# Patient Record
Sex: FEMALE | Race: WHITE | NOT HISPANIC OR LATINO
[De-identification: names, ages, dates, MRNs, and addresses within clinical notes are randomized per-mention and may not be internally consistent; named-entity substitution may affect disease eponyms.]

---

## 2017-05-23 ENCOUNTER — TRANSCRIPTION ENCOUNTER (OUTPATIENT)
Age: 27
End: 2017-05-23

## 2017-07-12 ENCOUNTER — TRANSCRIPTION ENCOUNTER (OUTPATIENT)
Age: 27
End: 2017-07-12

## 2018-09-27 ENCOUNTER — TRANSCRIPTION ENCOUNTER (OUTPATIENT)
Age: 28
End: 2018-09-27

## 2019-06-29 ENCOUNTER — TRANSCRIPTION ENCOUNTER (OUTPATIENT)
Age: 29
End: 2019-06-29

## 2019-07-09 ENCOUNTER — OTHER (OUTPATIENT)
Age: 29
End: 2019-07-09

## 2019-07-09 ENCOUNTER — APPOINTMENT (OUTPATIENT)
Dept: OBGYN | Facility: CLINIC | Age: 29
End: 2019-07-09

## 2019-07-11 LAB
APPEARANCE: CLEAR
BACTERIA UR CULT: NORMAL
BACTERIA: NEGATIVE
BILIRUBIN URINE: NEGATIVE
BLOOD URINE: ABNORMAL
COLOR: NORMAL
GLUCOSE QUALITATIVE U: NEGATIVE
HYALINE CASTS: 2 /LPF
KETONES URINE: NEGATIVE
LEUKOCYTE ESTERASE URINE: ABNORMAL
MICROSCOPIC-UA: NORMAL
NITRITE URINE: NEGATIVE
PH URINE: 6.5
PROTEIN URINE: NEGATIVE
RED BLOOD CELLS URINE: 2 /HPF
SPECIFIC GRAVITY URINE: 1.01
SQUAMOUS EPITHELIAL CELLS: 3 /HPF
UROBILINOGEN URINE: NORMAL
WHITE BLOOD CELLS URINE: 8 /HPF

## 2019-08-22 ENCOUNTER — APPOINTMENT (OUTPATIENT)
Dept: UROGYNECOLOGY | Facility: CLINIC | Age: 29
End: 2019-08-22

## 2019-12-02 ENCOUNTER — APPOINTMENT (OUTPATIENT)
Dept: OBGYN | Facility: CLINIC | Age: 29
End: 2019-12-02
Payer: COMMERCIAL

## 2019-12-02 VITALS
BODY MASS INDEX: 24.52 KG/M2 | HEIGHT: 65 IN | WEIGHT: 147.19 LBS | DIASTOLIC BLOOD PRESSURE: 88 MMHG | SYSTOLIC BLOOD PRESSURE: 122 MMHG

## 2019-12-02 DIAGNOSIS — Z78.9 OTHER SPECIFIED HEALTH STATUS: ICD-10-CM

## 2019-12-02 DIAGNOSIS — Z83.3 FAMILY HISTORY OF DIABETES MELLITUS: ICD-10-CM

## 2019-12-02 PROCEDURE — 99203 OFFICE O/P NEW LOW 30 MIN: CPT

## 2019-12-02 NOTE — HISTORY OF PRESENT ILLNESS
[Normal Amount/Duration] : was of a normal amount and duration [Regular Cycle Intervals] : periods have been regular [Frequency: Q ___ days] : menstrual periods occur approximately every [unfilled] days [Menarche Age: ____] : age at menarche was [unfilled] [Sexually Active] : is sexually active [Bleeding] : bleeding [Pregnancy] : pregnancy [Spotting Between  Menses] : no spotting between menses [Contraception] : does not use contraception

## 2019-12-02 NOTE — PHYSICAL EXAM
[Awake] : awake [Alert] : alert [Acute Distress] : no acute distress [Mass] : no breast mass [Nipple Discharge] : no nipple discharge [Axillary LAD] : no axillary lymphadenopathy [Soft] : soft [Oriented x3] : oriented to person, place, and time [Tender] : non tender [Normal] : uterus [Moderate] : there was moderate vaginal bleeding [Dilated] : the cervix was not dilated [Uterine Adnexae] : were not tender and not enlarged

## 2019-12-03 DIAGNOSIS — Z67.41 TYPE O BLOOD, RH NEGATIVE: ICD-10-CM

## 2019-12-03 LAB
ABO + RH PNL BLD: NORMAL
BLD GP AB SCN SERPL QL: NORMAL
HCG SERPL-MCNC: 339 MIU/ML

## 2019-12-03 RX ORDER — HUMAN RHO(D) IMMUNE GLOBULIN 300 UG/1
1500 INJECTION, SOLUTION INTRAMUSCULAR
Refills: 0 | Status: COMPLETED | OUTPATIENT
Start: 2019-12-03

## 2019-12-09 LAB — HCG SERPL-MCNC: 342 MIU/ML

## 2019-12-11 ENCOUNTER — APPOINTMENT (OUTPATIENT)
Dept: ANTEPARTUM | Facility: CLINIC | Age: 29
End: 2019-12-11
Payer: COMMERCIAL

## 2019-12-11 ENCOUNTER — ASOB RESULT (OUTPATIENT)
Age: 29
End: 2019-12-11

## 2019-12-11 LAB — HCG SERPL-MCNC: 273 MIU/ML

## 2019-12-11 PROCEDURE — 76856 US EXAM PELVIC COMPLETE: CPT | Mod: 59

## 2019-12-11 PROCEDURE — 76830 TRANSVAGINAL US NON-OB: CPT

## 2019-12-12 ENCOUNTER — APPOINTMENT (OUTPATIENT)
Dept: OBGYN | Facility: CLINIC | Age: 29
End: 2019-12-12

## 2019-12-20 LAB — HCG SERPL-MCNC: 126 MIU/ML

## 2020-04-25 ENCOUNTER — MESSAGE (OUTPATIENT)
Age: 30
End: 2020-04-25

## 2020-05-09 ENCOUNTER — APPOINTMENT (OUTPATIENT)
Dept: DISASTER EMERGENCY | Facility: CLINIC | Age: 30
End: 2020-05-09

## 2020-05-10 LAB
SARS-COV-2 IGG SERPL IA-ACNC: <0.1 INDEX
SARS-COV-2 IGG SERPL QL IA: NEGATIVE

## 2020-08-19 ENCOUNTER — APPOINTMENT (OUTPATIENT)
Dept: OBGYN | Facility: CLINIC | Age: 30
End: 2020-08-19
Payer: COMMERCIAL

## 2020-08-19 VITALS
WEIGHT: 143 LBS | BODY MASS INDEX: 23.82 KG/M2 | DIASTOLIC BLOOD PRESSURE: 70 MMHG | SYSTOLIC BLOOD PRESSURE: 118 MMHG | HEIGHT: 65 IN

## 2020-08-19 PROCEDURE — 99395 PREV VISIT EST AGE 18-39: CPT

## 2020-08-19 RX ORDER — FLUCONAZOLE 150 MG/1
150 TABLET ORAL
Qty: 1 | Refills: 1 | Status: DISCONTINUED | COMMUNITY
Start: 2020-08-13 | End: 2020-08-19

## 2020-08-19 NOTE — HISTORY OF PRESENT ILLNESS
[Normal Amount/Duration] : was of a normal amount and duration [Regular Cycle Intervals] : periods have been regular [Menarche Age: ____] : age at menarche was [unfilled] [Frequency: Q ___ days] : menstrual periods occur approximately every [unfilled] days [Spotting Between  Menses] : no spotting between menses [Sexually Active] : is sexually active [Contraception] : does not use contraception

## 2020-08-24 LAB — CYTOLOGY CVX/VAG DOC THIN PREP: NORMAL

## 2020-10-01 ENCOUNTER — APPOINTMENT (OUTPATIENT)
Dept: OBGYN | Facility: CLINIC | Age: 30
End: 2020-10-01
Payer: COMMERCIAL

## 2020-10-01 VITALS
DIASTOLIC BLOOD PRESSURE: 78 MMHG | SYSTOLIC BLOOD PRESSURE: 128 MMHG | WEIGHT: 143 LBS | BODY MASS INDEX: 23.82 KG/M2 | HEIGHT: 65 IN

## 2020-10-01 PROCEDURE — 99214 OFFICE O/P EST MOD 30 MIN: CPT

## 2020-10-01 NOTE — HISTORY OF PRESENT ILLNESS
[N] : Patient does not use contraception [Y] : Patient is sexually active [PGHxTotal] : 2 [PGHxFullTerm] : 0 [PGHxPremature] : 0 [PGHxAbortions] : 0 [[facility] : 0 [PGHxABInduced] : 0 [PGHxABSpont] : 1 [PGHxEctopic] : 0 [PGHxMultBirths] : 0 [Regular Cycle Intervals] : periods have been regular [Frequency: Q ___ days] : menstrual periods occur approximately every [unfilled] days [Menarche Age: ____] : age at menarche was [unfilled]

## 2020-10-01 NOTE — PHYSICAL EXAM
[Appropriately responsive] : appropriately responsive [Alert] : alert [No Acute Distress] : no acute distress [No Lymphadenopathy] : no lymphadenopathy [Regular Rate Rhythm] : regular rate rhythm [No Murmurs] : no murmurs [Clear to Auscultation B/L] : clear to auscultation bilaterally [Soft] : soft [Non-tender] : non-tender [Non-distended] : non-distended [No HSM] : No HSM [No Lesions] : no lesions [No Mass] : no mass [Oriented x3] : oriented x3 [Examination Of The Breasts] : a normal appearance [No Masses] : no breast masses were palpable [Labia Majora] : normal [Labia Minora] : normal [Normal] : normal [Enlarged ___ wks] : enlarged [unfilled] ~Uweeks [Uterine Adnexae] : normal

## 2020-10-02 LAB
ABO + RH PNL BLD: NORMAL
ALBUMIN SERPL ELPH-MCNC: 5.1 G/DL
ALP BLD-CCNC: 42 U/L
ALT SERPL-CCNC: 11 U/L
ANION GAP SERPL CALC-SCNC: 20 MMOL/L
APPEARANCE: CLEAR
AST SERPL-CCNC: 14 U/L
BASOPHILS # BLD AUTO: 0.03 K/UL
BASOPHILS NFR BLD AUTO: 0.4 %
BILIRUB SERPL-MCNC: 0.3 MG/DL
BILIRUBIN URINE: NEGATIVE
BLD GP AB SCN SERPL QL: NORMAL
BLOOD URINE: NEGATIVE
BUN SERPL-MCNC: 15 MG/DL
C TRACH RRNA SPEC QL NAA+PROBE: NOT DETECTED
CALCIUM SERPL-MCNC: 9.7 MG/DL
CHLORIDE SERPL-SCNC: 98 MMOL/L
CO2 SERPL-SCNC: 21 MMOL/L
COLOR: NORMAL
CREAT SERPL-MCNC: 0.66 MG/DL
EOSINOPHIL # BLD AUTO: 0.04 K/UL
EOSINOPHIL NFR BLD AUTO: 0.6 %
ESTIMATED AVERAGE GLUCOSE: 97 MG/DL
GLUCOSE QUALITATIVE U: NEGATIVE
GLUCOSE SERPL-MCNC: 38 MG/DL
HBA1C MFR BLD HPLC: 5 %
HBV SURFACE AG SER QL: NONREACTIVE
HCT VFR BLD CALC: 41.4 %
HCV AB SER QL: NONREACTIVE
HCV S/CO RATIO: 0.71 S/CO
HGB A MFR BLD: 97.1 %
HGB A2 MFR BLD: 2.6 %
HGB BLD-MCNC: 13 G/DL
HGB FRACT BLD-IMP: NORMAL
HIV1+2 AB SPEC QL IA.RAPID: NONREACTIVE
IMM GRANULOCYTES NFR BLD AUTO: 0.3 %
KETONES URINE: NEGATIVE
LEUKOCYTE ESTERASE URINE: NEGATIVE
LYMPHOCYTES # BLD AUTO: 1.99 K/UL
LYMPHOCYTES NFR BLD AUTO: 28.5 %
MAN DIFF?: NORMAL
MCHC RBC-ENTMCNC: 30.4 PG
MCHC RBC-ENTMCNC: 31.4 GM/DL
MCV RBC AUTO: 96.7 FL
MONOCYTES # BLD AUTO: 0.51 K/UL
MONOCYTES NFR BLD AUTO: 7.3 %
N GONORRHOEA RRNA SPEC QL NAA+PROBE: NOT DETECTED
NEUTROPHILS # BLD AUTO: 4.39 K/UL
NEUTROPHILS NFR BLD AUTO: 62.9 %
NITRITE URINE: NEGATIVE
PH URINE: 6.5
PLATELET # BLD AUTO: 236 K/UL
POTASSIUM SERPL-SCNC: 3.6 MMOL/L
PROGEST SERPL-MCNC: 24.8 NG/ML
PROT SERPL-MCNC: 7.8 G/DL
PROTEIN URINE: NEGATIVE
RBC # BLD: 4.28 M/UL
RBC # FLD: 12.4 %
SODIUM SERPL-SCNC: 138 MMOL/L
SOURCE AMPLIFICATION: NORMAL
SPECIFIC GRAVITY URINE: 1.01
T PALLIDUM AB SER QL IA: NEGATIVE
TSH SERPL-ACNC: 0.62 UIU/ML
UROBILINOGEN URINE: NORMAL
WBC # FLD AUTO: 6.98 K/UL

## 2020-10-05 LAB
CMV IGG SERPL QL: <0.2 U/ML
CMV IGG SERPL-IMP: NEGATIVE
M TB IFN-G BLD-IMP: NEGATIVE
MEV IGG FLD QL IA: 29.2 AU/ML
MEV IGG+IGM SER-IMP: POSITIVE
MUV AB SER-ACNC: POSITIVE
MUV IGG SER QL IA: 228 AU/ML
QUANTIFERON TB PLUS MITOGEN MINUS NIL: 8.27 IU/ML
QUANTIFERON TB PLUS NIL: 0.02 IU/ML
QUANTIFERON TB PLUS TB1 MINUS NIL: -0.01 IU/ML
QUANTIFERON TB PLUS TB2 MINUS NIL: -0.01 IU/ML
RUBV IGG FLD-ACNC: 10.8 INDEX
RUBV IGG SER-IMP: POSITIVE
T GONDII AB SER-IMP: NEGATIVE
T GONDII IGG SER QL: <3 IU/ML
VZV AB TITR SER: POSITIVE
VZV IGG SER IF-ACNC: 768.8 INDEX

## 2020-10-06 LAB
AR GENE MUT ANL BLD/T: NORMAL
B19V IGG SER QL IA: 5.3 INDEX
B19V IGG+IGM SER-IMP: NORMAL
B19V IGG+IGM SER-IMP: POSITIVE
B19V IGM FLD-ACNC: 0.1
B19V IGM SER-ACNC: NEGATIVE
LEAD BLD-MCNC: <1 UG/DL

## 2020-10-07 LAB — FMR1 GENE MUT ANL BLD/T: NORMAL

## 2020-10-09 ENCOUNTER — APPOINTMENT (OUTPATIENT)
Dept: ANTEPARTUM | Facility: CLINIC | Age: 30
End: 2020-10-09
Payer: COMMERCIAL

## 2020-10-09 ENCOUNTER — TRANSCRIPTION ENCOUNTER (OUTPATIENT)
Age: 30
End: 2020-10-09

## 2020-10-09 ENCOUNTER — ASOB RESULT (OUTPATIENT)
Age: 30
End: 2020-10-09

## 2020-10-09 LAB — CFTR MUT TESTED BLD/T: NEGATIVE

## 2020-10-09 PROCEDURE — 76817 TRANSVAGINAL US OBSTETRIC: CPT

## 2020-10-20 ENCOUNTER — NON-APPOINTMENT (OUTPATIENT)
Age: 30
End: 2020-10-20

## 2020-10-20 DIAGNOSIS — Z00.00 ENCOUNTER FOR GENERAL ADULT MEDICAL EXAMINATION W/OUT ABNORMAL FINDINGS: ICD-10-CM

## 2020-10-27 ENCOUNTER — NON-APPOINTMENT (OUTPATIENT)
Age: 30
End: 2020-10-27

## 2020-10-27 ENCOUNTER — APPOINTMENT (OUTPATIENT)
Dept: OBGYN | Facility: CLINIC | Age: 30
End: 2020-10-27
Payer: COMMERCIAL

## 2020-10-27 VITALS
BODY MASS INDEX: 24.32 KG/M2 | SYSTOLIC BLOOD PRESSURE: 115 MMHG | HEIGHT: 65 IN | TEMPERATURE: 96.1 F | DIASTOLIC BLOOD PRESSURE: 80 MMHG | WEIGHT: 146 LBS

## 2020-10-27 PROCEDURE — 0501F PRENATAL FLOW SHEET: CPT

## 2020-11-13 ENCOUNTER — APPOINTMENT (OUTPATIENT)
Dept: ANTEPARTUM | Facility: CLINIC | Age: 30
End: 2020-11-13
Payer: COMMERCIAL

## 2020-11-13 ENCOUNTER — ASOB RESULT (OUTPATIENT)
Age: 30
End: 2020-11-13

## 2020-11-13 PROCEDURE — 36416 COLLJ CAPILLARY BLOOD SPEC: CPT

## 2020-11-13 PROCEDURE — 76813 OB US NUCHAL MEAS 1 GEST: CPT

## 2020-11-13 PROCEDURE — 99072 ADDL SUPL MATRL&STAF TM PHE: CPT

## 2020-11-20 LAB
1ST TRIMESTER DATA: NORMAL
ADDENDUM DOC: NORMAL
AFP PNL SERPL: NORMAL
AFP SERPL-ACNC: NORMAL
CLINICAL BIOCHEMIST REVIEW: NORMAL
FREE BETA HCG 1ST TRIMESTER: NORMAL
Lab: NORMAL
NASAL BONE: PRESENT
NOTES NTD: NORMAL
NT: NORMAL
PAPP-A SERPL-ACNC: NORMAL
TRISOMY 18/3: NORMAL

## 2020-11-24 ENCOUNTER — NON-APPOINTMENT (OUTPATIENT)
Age: 30
End: 2020-11-24

## 2020-11-24 ENCOUNTER — APPOINTMENT (OUTPATIENT)
Dept: OBGYN | Facility: CLINIC | Age: 30
End: 2020-11-24
Payer: COMMERCIAL

## 2020-11-24 VITALS
SYSTOLIC BLOOD PRESSURE: 120 MMHG | BODY MASS INDEX: 24.41 KG/M2 | DIASTOLIC BLOOD PRESSURE: 70 MMHG | HEIGHT: 65 IN | WEIGHT: 146.5 LBS

## 2020-11-24 PROCEDURE — 0502F SUBSEQUENT PRENATAL CARE: CPT

## 2020-12-11 LAB
1ST TRIMESTER DATA: NORMAL
2ND TRIMESTER DATA: NORMAL
AFP PNL SERPL: NORMAL
AFP SERPL-ACNC: NORMAL
AFP SERPL-ACNC: NORMAL
B-HCG FREE SERPL-MCNC: NORMAL
CLINICAL BIOCHEMIST REVIEW: NORMAL
FREE BETA HCG 1ST TRIMESTER: NORMAL
INHIBIN A SERPL-MCNC: NORMAL
NASAL BONE: PRESENT
NOTES NTD: NORMAL
NT: NORMAL
PAPP-A SERPL-ACNC: NORMAL
U ESTRIOL SERPL-SCNC: NORMAL

## 2020-12-22 ENCOUNTER — APPOINTMENT (OUTPATIENT)
Dept: OBGYN | Facility: CLINIC | Age: 30
End: 2020-12-22
Payer: COMMERCIAL

## 2020-12-22 ENCOUNTER — NON-APPOINTMENT (OUTPATIENT)
Age: 30
End: 2020-12-22

## 2020-12-22 VITALS
WEIGHT: 146 LBS | SYSTOLIC BLOOD PRESSURE: 118 MMHG | BODY MASS INDEX: 24.32 KG/M2 | DIASTOLIC BLOOD PRESSURE: 70 MMHG | HEIGHT: 65 IN

## 2020-12-22 DIAGNOSIS — Z87.59 PERSONAL HISTORY OF OTHER COMPLICATIONS OF PREGNANCY, CHILDBIRTH AND THE PUERPERIUM: ICD-10-CM

## 2020-12-22 DIAGNOSIS — Z34.91 ENCOUNTER FOR SUPERVISION OF NORMAL PREGNANCY, UNSPECIFIED, FIRST TRIMESTER: ICD-10-CM

## 2020-12-22 DIAGNOSIS — Z13.71 ENCOUNTER FOR NONPROCREATIVE SCREENING FOR GENETIC DISEASE CARRIER STATUS: ICD-10-CM

## 2020-12-22 DIAGNOSIS — Z01.419 ENCOUNTER FOR GYNECOLOGICAL EXAMINATION (GENERAL) (ROUTINE) W/OUT ABNORMAL FINDINGS: ICD-10-CM

## 2020-12-22 DIAGNOSIS — Z87.42 PERSONAL HISTORY OF OTHER DISEASES OF THE FEMALE GENITAL TRACT: ICD-10-CM

## 2020-12-22 PROCEDURE — 0502F SUBSEQUENT PRENATAL CARE: CPT

## 2021-01-08 ENCOUNTER — APPOINTMENT (OUTPATIENT)
Dept: ANTEPARTUM | Facility: CLINIC | Age: 31
End: 2021-01-08
Payer: COMMERCIAL

## 2021-01-08 ENCOUNTER — ASOB RESULT (OUTPATIENT)
Age: 31
End: 2021-01-08

## 2021-01-08 PROCEDURE — 99072 ADDL SUPL MATRL&STAF TM PHE: CPT

## 2021-01-08 PROCEDURE — 76805 OB US >/= 14 WKS SNGL FETUS: CPT

## 2021-01-08 PROCEDURE — 76817 TRANSVAGINAL US OBSTETRIC: CPT

## 2021-01-19 ENCOUNTER — APPOINTMENT (OUTPATIENT)
Dept: OBGYN | Facility: CLINIC | Age: 31
End: 2021-01-19
Payer: COMMERCIAL

## 2021-01-19 ENCOUNTER — NON-APPOINTMENT (OUTPATIENT)
Age: 31
End: 2021-01-19

## 2021-01-19 VITALS
BODY MASS INDEX: 24.99 KG/M2 | DIASTOLIC BLOOD PRESSURE: 60 MMHG | WEIGHT: 150 LBS | HEIGHT: 65 IN | SYSTOLIC BLOOD PRESSURE: 110 MMHG

## 2021-01-19 PROCEDURE — 0502F SUBSEQUENT PRENATAL CARE: CPT

## 2021-02-09 ENCOUNTER — APPOINTMENT (OUTPATIENT)
Dept: OBGYN | Facility: CLINIC | Age: 31
End: 2021-02-09
Payer: COMMERCIAL

## 2021-02-09 ENCOUNTER — NON-APPOINTMENT (OUTPATIENT)
Age: 31
End: 2021-02-09

## 2021-02-09 VITALS
SYSTOLIC BLOOD PRESSURE: 106 MMHG | DIASTOLIC BLOOD PRESSURE: 60 MMHG | WEIGHT: 151.31 LBS | BODY MASS INDEX: 25.18 KG/M2

## 2021-02-09 VITALS
DIASTOLIC BLOOD PRESSURE: 60 MMHG | BODY MASS INDEX: 25.18 KG/M2 | SYSTOLIC BLOOD PRESSURE: 106 MMHG | WEIGHT: 151.31 LBS

## 2021-02-09 PROCEDURE — 0502F SUBSEQUENT PRENATAL CARE: CPT

## 2021-02-10 LAB
APPEARANCE: CLEAR
BASOPHILS # BLD AUTO: 0.03 K/UL
BASOPHILS NFR BLD AUTO: 0.4 %
BILIRUBIN URINE: NEGATIVE
BLD GP AB SCN SERPL QL: NORMAL
BLOOD URINE: NEGATIVE
COLOR: NORMAL
EOSINOPHIL # BLD AUTO: 0.02 K/UL
EOSINOPHIL NFR BLD AUTO: 0.2 %
GLUCOSE 1H P 50 G GLC PO SERPL-MCNC: 67 MG/DL
GLUCOSE QUALITATIVE U: NEGATIVE
HCT VFR BLD CALC: 38.9 %
HGB BLD-MCNC: 11.8 G/DL
IMM GRANULOCYTES NFR BLD AUTO: 0.6 %
KETONES URINE: NEGATIVE
LEUKOCYTE ESTERASE URINE: NEGATIVE
LYMPHOCYTES # BLD AUTO: 1.59 K/UL
LYMPHOCYTES NFR BLD AUTO: 19.9 %
MAN DIFF?: NORMAL
MCHC RBC-ENTMCNC: 30 PG
MCHC RBC-ENTMCNC: 30.3 GM/DL
MCV RBC AUTO: 99 FL
MONOCYTES # BLD AUTO: 0.57 K/UL
MONOCYTES NFR BLD AUTO: 7.1 %
NEUTROPHILS # BLD AUTO: 5.75 K/UL
NEUTROPHILS NFR BLD AUTO: 71.8 %
NITRITE URINE: NEGATIVE
PH URINE: 7
PLATELET # BLD AUTO: 178 K/UL
PROTEIN URINE: NEGATIVE
RBC # BLD: 3.93 M/UL
RBC # FLD: 14 %
SPECIFIC GRAVITY URINE: 1.01
UROBILINOGEN URINE: NORMAL
WBC # FLD AUTO: 8.01 K/UL

## 2021-02-11 LAB — BACTERIA UR CULT: NORMAL

## 2021-03-02 ENCOUNTER — APPOINTMENT (OUTPATIENT)
Dept: OBGYN | Facility: CLINIC | Age: 31
End: 2021-03-02
Payer: COMMERCIAL

## 2021-03-02 ENCOUNTER — NON-APPOINTMENT (OUTPATIENT)
Age: 31
End: 2021-03-02

## 2021-03-02 VITALS
HEIGHT: 65 IN | SYSTOLIC BLOOD PRESSURE: 110 MMHG | DIASTOLIC BLOOD PRESSURE: 70 MMHG | WEIGHT: 153 LBS | BODY MASS INDEX: 25.49 KG/M2

## 2021-03-02 PROCEDURE — 96372 THER/PROPH/DIAG INJ SC/IM: CPT

## 2021-03-02 PROCEDURE — 90384 RH IG FULL-DOSE IM: CPT

## 2021-03-02 PROCEDURE — 0502F SUBSEQUENT PRENATAL CARE: CPT

## 2021-03-02 RX ORDER — HUMAN RHO(D) IMMUNE GLOBULIN 300 UG/1
1500 INJECTION, SOLUTION INTRAMUSCULAR
Refills: 0 | Status: COMPLETED | OUTPATIENT
Start: 2021-03-02

## 2021-03-02 RX ADMIN — HUMAN RHO(D) IMMUNE GLOBULIN 0 UNIT: 300 INJECTION, SOLUTION INTRAMUSCULAR at 00:00

## 2021-03-03 ENCOUNTER — ASOB RESULT (OUTPATIENT)
Age: 31
End: 2021-03-03

## 2021-03-03 ENCOUNTER — APPOINTMENT (OUTPATIENT)
Dept: ANTEPARTUM | Facility: CLINIC | Age: 31
End: 2021-03-03
Payer: COMMERCIAL

## 2021-03-03 PROCEDURE — 76816 OB US FOLLOW-UP PER FETUS: CPT

## 2021-03-03 PROCEDURE — 99072 ADDL SUPL MATRL&STAF TM PHE: CPT

## 2021-03-23 ENCOUNTER — NON-APPOINTMENT (OUTPATIENT)
Age: 31
End: 2021-03-23

## 2021-03-23 ENCOUNTER — APPOINTMENT (OUTPATIENT)
Dept: OBGYN | Facility: CLINIC | Age: 31
End: 2021-03-23
Payer: COMMERCIAL

## 2021-03-23 VITALS
HEIGHT: 65 IN | SYSTOLIC BLOOD PRESSURE: 119 MMHG | WEIGHT: 155.56 LBS | DIASTOLIC BLOOD PRESSURE: 74 MMHG | BODY MASS INDEX: 25.92 KG/M2

## 2021-03-23 PROCEDURE — 0502F SUBSEQUENT PRENATAL CARE: CPT

## 2021-03-31 ENCOUNTER — ASOB RESULT (OUTPATIENT)
Age: 31
End: 2021-03-31

## 2021-03-31 ENCOUNTER — APPOINTMENT (OUTPATIENT)
Dept: ANTEPARTUM | Facility: CLINIC | Age: 31
End: 2021-03-31
Payer: COMMERCIAL

## 2021-03-31 PROCEDURE — 93976 VASCULAR STUDY: CPT

## 2021-03-31 PROCEDURE — 76820 UMBILICAL ARTERY ECHO: CPT

## 2021-03-31 PROCEDURE — 76816 OB US FOLLOW-UP PER FETUS: CPT

## 2021-03-31 PROCEDURE — 99072 ADDL SUPL MATRL&STAF TM PHE: CPT

## 2021-04-02 ENCOUNTER — APPOINTMENT (OUTPATIENT)
Dept: ANTEPARTUM | Facility: CLINIC | Age: 31
End: 2021-04-02

## 2021-04-05 DIAGNOSIS — Z34.92 ENCOUNTER FOR SUPERVISION OF NORMAL PREGNANCY, UNSPECIFIED, SECOND TRIMESTER: ICD-10-CM

## 2021-04-06 ENCOUNTER — NON-APPOINTMENT (OUTPATIENT)
Age: 31
End: 2021-04-06

## 2021-04-06 ENCOUNTER — APPOINTMENT (OUTPATIENT)
Dept: OBGYN | Facility: CLINIC | Age: 31
End: 2021-04-06
Payer: COMMERCIAL

## 2021-04-06 VITALS
SYSTOLIC BLOOD PRESSURE: 100 MMHG | DIASTOLIC BLOOD PRESSURE: 60 MMHG | BODY MASS INDEX: 26.06 KG/M2 | WEIGHT: 156.44 LBS | HEIGHT: 65 IN

## 2021-04-06 PROCEDURE — 0502F SUBSEQUENT PRENATAL CARE: CPT

## 2021-04-20 ENCOUNTER — APPOINTMENT (OUTPATIENT)
Dept: OBGYN | Facility: CLINIC | Age: 31
End: 2021-04-20
Payer: COMMERCIAL

## 2021-04-20 ENCOUNTER — NON-APPOINTMENT (OUTPATIENT)
Age: 31
End: 2021-04-20

## 2021-04-20 VITALS
SYSTOLIC BLOOD PRESSURE: 124 MMHG | WEIGHT: 161.44 LBS | DIASTOLIC BLOOD PRESSURE: 72 MMHG | BODY MASS INDEX: 26.86 KG/M2

## 2021-04-20 PROCEDURE — 0502F SUBSEQUENT PRENATAL CARE: CPT

## 2021-04-23 ENCOUNTER — ASOB RESULT (OUTPATIENT)
Age: 31
End: 2021-04-23

## 2021-04-23 ENCOUNTER — APPOINTMENT (OUTPATIENT)
Dept: ANTEPARTUM | Facility: CLINIC | Age: 31
End: 2021-04-23
Payer: COMMERCIAL

## 2021-04-23 PROCEDURE — 99072 ADDL SUPL MATRL&STAF TM PHE: CPT

## 2021-04-23 PROCEDURE — 76819 FETAL BIOPHYS PROFIL W/O NST: CPT

## 2021-04-23 PROCEDURE — 76820 UMBILICAL ARTERY ECHO: CPT

## 2021-04-23 PROCEDURE — 76816 OB US FOLLOW-UP PER FETUS: CPT

## 2021-04-23 PROCEDURE — 93976 VASCULAR STUDY: CPT

## 2021-04-27 ENCOUNTER — APPOINTMENT (OUTPATIENT)
Dept: OBGYN | Facility: CLINIC | Age: 31
End: 2021-04-27
Payer: COMMERCIAL

## 2021-04-27 ENCOUNTER — NON-APPOINTMENT (OUTPATIENT)
Age: 31
End: 2021-04-27

## 2021-04-27 VITALS
WEIGHT: 160 LBS | HEIGHT: 65 IN | DIASTOLIC BLOOD PRESSURE: 77 MMHG | BODY MASS INDEX: 26.66 KG/M2 | SYSTOLIC BLOOD PRESSURE: 125 MMHG

## 2021-04-27 PROCEDURE — 90471 IMMUNIZATION ADMIN: CPT

## 2021-04-27 PROCEDURE — 0502F SUBSEQUENT PRENATAL CARE: CPT

## 2021-04-27 PROCEDURE — 90715 TDAP VACCINE 7 YRS/> IM: CPT

## 2021-04-28 LAB — HIV1+2 AB SPEC QL IA.RAPID: NONREACTIVE

## 2021-04-29 LAB
GP B STREP DNA SPEC QL NAA+PROBE: NORMAL
GP B STREP DNA SPEC QL NAA+PROBE: NOT DETECTED
SOURCE GBS: NORMAL

## 2021-05-04 ENCOUNTER — NON-APPOINTMENT (OUTPATIENT)
Age: 31
End: 2021-05-04

## 2021-05-04 ENCOUNTER — APPOINTMENT (OUTPATIENT)
Dept: OBGYN | Facility: CLINIC | Age: 31
End: 2021-05-04
Payer: COMMERCIAL

## 2021-05-04 VITALS
HEIGHT: 65 IN | SYSTOLIC BLOOD PRESSURE: 122 MMHG | DIASTOLIC BLOOD PRESSURE: 77 MMHG | WEIGHT: 160.44 LBS | BODY MASS INDEX: 26.73 KG/M2

## 2021-05-04 PROCEDURE — 0502F SUBSEQUENT PRENATAL CARE: CPT

## 2021-05-11 ENCOUNTER — NON-APPOINTMENT (OUTPATIENT)
Age: 31
End: 2021-05-11

## 2021-05-11 ENCOUNTER — APPOINTMENT (OUTPATIENT)
Dept: OBGYN | Facility: CLINIC | Age: 31
End: 2021-05-11
Payer: COMMERCIAL

## 2021-05-11 VITALS
WEIGHT: 163.06 LBS | BODY MASS INDEX: 27.17 KG/M2 | SYSTOLIC BLOOD PRESSURE: 121 MMHG | DIASTOLIC BLOOD PRESSURE: 78 MMHG | HEIGHT: 65 IN

## 2021-05-11 PROCEDURE — 0502F SUBSEQUENT PRENATAL CARE: CPT

## 2021-05-12 DIAGNOSIS — Z23 ENCOUNTER FOR IMMUNIZATION: ICD-10-CM

## 2021-05-13 ENCOUNTER — APPOINTMENT (OUTPATIENT)
Dept: OBGYN | Facility: CLINIC | Age: 31
End: 2021-05-13

## 2021-05-18 ENCOUNTER — APPOINTMENT (OUTPATIENT)
Dept: OBGYN | Facility: CLINIC | Age: 31
End: 2021-05-18
Payer: COMMERCIAL

## 2021-05-18 ENCOUNTER — ASOB RESULT (OUTPATIENT)
Age: 31
End: 2021-05-18

## 2021-05-18 ENCOUNTER — NON-APPOINTMENT (OUTPATIENT)
Age: 31
End: 2021-05-18

## 2021-05-18 ENCOUNTER — APPOINTMENT (OUTPATIENT)
Dept: ANTEPARTUM | Facility: CLINIC | Age: 31
End: 2021-05-18
Payer: COMMERCIAL

## 2021-05-18 VITALS
HEIGHT: 65 IN | SYSTOLIC BLOOD PRESSURE: 119 MMHG | WEIGHT: 164 LBS | DIASTOLIC BLOOD PRESSURE: 74 MMHG | BODY MASS INDEX: 27.32 KG/M2

## 2021-05-18 PROCEDURE — 76816 OB US FOLLOW-UP PER FETUS: CPT

## 2021-05-18 PROCEDURE — 0502F SUBSEQUENT PRENATAL CARE: CPT

## 2021-05-18 PROCEDURE — 99072 ADDL SUPL MATRL&STAF TM PHE: CPT

## 2021-05-25 ENCOUNTER — APPOINTMENT (OUTPATIENT)
Dept: OBGYN | Facility: CLINIC | Age: 31
End: 2021-05-25

## 2021-05-25 ENCOUNTER — INPATIENT (INPATIENT)
Facility: HOSPITAL | Age: 31
LOS: 1 days | Discharge: HOME | End: 2021-05-27
Attending: OBSTETRICS & GYNECOLOGY | Admitting: OBSTETRICS & GYNECOLOGY
Payer: COMMERCIAL

## 2021-05-25 VITALS — TEMPERATURE: 98 F

## 2021-05-25 DIAGNOSIS — O47.1 FALSE LABOR AT OR AFTER 37 COMPLETED WEEKS OF GESTATION: ICD-10-CM

## 2021-05-25 LAB
ABO RH CONFIRMATION: SIGNIFICANT CHANGE UP
ALLERGY+IMMUNOLOGY DIAG STUDY NOTE: SIGNIFICANT CHANGE UP
APTT BLD: 28.4 SEC — SIGNIFICANT CHANGE UP (ref 27.5–35.5)
BASOPHILS # BLD AUTO: 0.03 K/UL — SIGNIFICANT CHANGE UP (ref 0–0.2)
BASOPHILS # BLD AUTO: 0.04 K/UL — SIGNIFICANT CHANGE UP (ref 0–0.2)
BASOPHILS NFR BLD AUTO: 0.3 % — SIGNIFICANT CHANGE UP (ref 0–2)
BASOPHILS NFR BLD AUTO: 0.4 % — SIGNIFICANT CHANGE UP (ref 0–2)
BLD GP AB SCN SERPL QL: SIGNIFICANT CHANGE UP
COVID-19 SPIKE DOMAIN AB INTERP: NEGATIVE — SIGNIFICANT CHANGE UP
COVID-19 SPIKE DOMAIN ANTIBODY RESULT: 0.4 U/ML — SIGNIFICANT CHANGE UP
DIR ANTIGLOB POLYSPECIFIC INTERPRETATION: SIGNIFICANT CHANGE UP
EOSINOPHIL # BLD AUTO: 0.01 K/UL — SIGNIFICANT CHANGE UP (ref 0–0.5)
EOSINOPHIL # BLD AUTO: 0.04 K/UL — SIGNIFICANT CHANGE UP (ref 0–0.5)
EOSINOPHIL NFR BLD AUTO: 0.1 % — SIGNIFICANT CHANGE UP (ref 0–6)
EOSINOPHIL NFR BLD AUTO: 0.4 % — SIGNIFICANT CHANGE UP (ref 0–6)
FIBRINOGEN PPP-MCNC: 402 MG/DL — SIGNIFICANT CHANGE UP (ref 290–520)
GLUCOSE BLDC GLUCOMTR-MCNC: 137 MG/DL — HIGH (ref 70–99)
HCT VFR BLD CALC: 32.1 % — LOW (ref 34.5–45)
HCT VFR BLD CALC: 39.1 % — SIGNIFICANT CHANGE UP (ref 34.5–45)
HGB BLD-MCNC: 10.7 G/DL — LOW (ref 11.5–15.5)
HGB BLD-MCNC: 13.2 G/DL — SIGNIFICANT CHANGE UP (ref 11.5–15.5)
IMM GRANULOCYTES NFR BLD AUTO: 0.4 % — SIGNIFICANT CHANGE UP (ref 0–1.5)
IMM GRANULOCYTES NFR BLD AUTO: 0.5 % — SIGNIFICANT CHANGE UP (ref 0–1.5)
INR BLD: 1.06 RATIO — SIGNIFICANT CHANGE UP (ref 0.88–1.16)
LYMPHOCYTES # BLD AUTO: 19.7 % — SIGNIFICANT CHANGE UP (ref 13–44)
LYMPHOCYTES # BLD AUTO: 2.05 K/UL — SIGNIFICANT CHANGE UP (ref 1–3.3)
LYMPHOCYTES # BLD AUTO: 2.42 K/UL — SIGNIFICANT CHANGE UP (ref 1–3.3)
LYMPHOCYTES # BLD AUTO: 25.9 % — SIGNIFICANT CHANGE UP (ref 13–44)
MCHC RBC-ENTMCNC: 29.7 PG — SIGNIFICANT CHANGE UP (ref 27–34)
MCHC RBC-ENTMCNC: 29.9 PG — SIGNIFICANT CHANGE UP (ref 27–34)
MCHC RBC-ENTMCNC: 33.3 GM/DL — SIGNIFICANT CHANGE UP (ref 32–36)
MCHC RBC-ENTMCNC: 33.8 GM/DL — SIGNIFICANT CHANGE UP (ref 32–36)
MCV RBC AUTO: 87.9 FL — SIGNIFICANT CHANGE UP (ref 80–100)
MCV RBC AUTO: 89.7 FL — SIGNIFICANT CHANGE UP (ref 80–100)
MONOCYTES # BLD AUTO: 0.56 K/UL — SIGNIFICANT CHANGE UP (ref 0–0.9)
MONOCYTES # BLD AUTO: 0.85 K/UL — SIGNIFICANT CHANGE UP (ref 0–0.9)
MONOCYTES NFR BLD AUTO: 6 % — SIGNIFICANT CHANGE UP (ref 2–14)
MONOCYTES NFR BLD AUTO: 8.2 % — SIGNIFICANT CHANGE UP (ref 2–14)
NEUTROPHILS # BLD AUTO: 6.24 K/UL — SIGNIFICANT CHANGE UP (ref 1.8–7.4)
NEUTROPHILS # BLD AUTO: 7.43 K/UL — HIGH (ref 1.8–7.4)
NEUTROPHILS NFR BLD AUTO: 66.9 % — SIGNIFICANT CHANGE UP (ref 43–77)
NEUTROPHILS NFR BLD AUTO: 71.2 % — SIGNIFICANT CHANGE UP (ref 43–77)
PLATELET # BLD AUTO: 117 K/UL — LOW (ref 150–400)
PLATELET # BLD AUTO: 132 K/UL — LOW (ref 150–400)
PROTHROM AB SERPL-ACNC: 12.3 SEC — SIGNIFICANT CHANGE UP (ref 10.6–13.6)
RBC # BLD: 3.58 M/UL — LOW (ref 3.8–5.2)
RBC # BLD: 4.45 M/UL — SIGNIFICANT CHANGE UP (ref 3.8–5.2)
RBC # FLD: 13 % — SIGNIFICANT CHANGE UP (ref 10.3–14.5)
RBC # FLD: 13.2 % — SIGNIFICANT CHANGE UP (ref 10.3–14.5)
SARS-COV-2 IGG+IGM SERPL QL IA: 0.4 U/ML — SIGNIFICANT CHANGE UP
SARS-COV-2 IGG+IGM SERPL QL IA: NEGATIVE — SIGNIFICANT CHANGE UP
SARS-COV-2 RNA SPEC QL NAA+PROBE: SIGNIFICANT CHANGE UP
T PALLIDUM AB TITR SER: NEGATIVE — SIGNIFICANT CHANGE UP
WBC # BLD: 10.42 K/UL — SIGNIFICANT CHANGE UP (ref 3.8–10.5)
WBC # BLD: 9.34 K/UL — SIGNIFICANT CHANGE UP (ref 3.8–10.5)
WBC # FLD AUTO: 10.42 K/UL — SIGNIFICANT CHANGE UP (ref 3.8–10.5)
WBC # FLD AUTO: 9.34 K/UL — SIGNIFICANT CHANGE UP (ref 3.8–10.5)

## 2021-05-25 PROCEDURE — 59400 OBSTETRICAL CARE: CPT

## 2021-05-25 PROCEDURE — 86077 PHYS BLOOD BANK SERV XMATCH: CPT

## 2021-05-25 RX ORDER — SODIUM CHLORIDE 9 MG/ML
1000 INJECTION, SOLUTION INTRAVENOUS
Refills: 0 | Status: DISCONTINUED | OUTPATIENT
Start: 2021-05-25 | End: 2021-05-25

## 2021-05-25 RX ORDER — DIPHENHYDRAMINE HCL 50 MG
25 CAPSULE ORAL EVERY 6 HOURS
Refills: 0 | Status: DISCONTINUED | OUTPATIENT
Start: 2021-05-25 | End: 2021-05-27

## 2021-05-25 RX ORDER — DIBUCAINE 1 %
1 OINTMENT (GRAM) RECTAL EVERY 6 HOURS
Refills: 0 | Status: DISCONTINUED | OUTPATIENT
Start: 2021-05-25 | End: 2021-05-27

## 2021-05-25 RX ORDER — OXYCODONE HYDROCHLORIDE 5 MG/1
5 TABLET ORAL
Refills: 0 | Status: DISCONTINUED | OUTPATIENT
Start: 2021-05-25 | End: 2021-05-27

## 2021-05-25 RX ORDER — IBUPROFEN 200 MG
600 TABLET ORAL EVERY 6 HOURS
Refills: 0 | Status: COMPLETED | OUTPATIENT
Start: 2021-05-25 | End: 2022-04-23

## 2021-05-25 RX ORDER — LANOLIN
1 OINTMENT (GRAM) TOPICAL EVERY 6 HOURS
Refills: 0 | Status: DISCONTINUED | OUTPATIENT
Start: 2021-05-25 | End: 2021-05-27

## 2021-05-25 RX ORDER — IBUPROFEN 200 MG
600 TABLET ORAL EVERY 6 HOURS
Refills: 0 | Status: DISCONTINUED | OUTPATIENT
Start: 2021-05-25 | End: 2021-05-27

## 2021-05-25 RX ORDER — PRAMOXINE HYDROCHLORIDE 150 MG/15G
1 AEROSOL, FOAM RECTAL EVERY 4 HOURS
Refills: 0 | Status: DISCONTINUED | OUTPATIENT
Start: 2021-05-25 | End: 2021-05-27

## 2021-05-25 RX ORDER — AER TRAVELER 0.5 G/1
1 SOLUTION RECTAL; TOPICAL EVERY 4 HOURS
Refills: 0 | Status: DISCONTINUED | OUTPATIENT
Start: 2021-05-25 | End: 2021-05-27

## 2021-05-25 RX ORDER — SODIUM CHLORIDE 9 MG/ML
3 INJECTION INTRAMUSCULAR; INTRAVENOUS; SUBCUTANEOUS EVERY 8 HOURS
Refills: 0 | Status: DISCONTINUED | OUTPATIENT
Start: 2021-05-25 | End: 2021-05-27

## 2021-05-25 RX ORDER — OXYTOCIN 10 UNIT/ML
333.33 VIAL (ML) INJECTION
Qty: 20 | Refills: 0 | Status: DISCONTINUED | OUTPATIENT
Start: 2021-05-25 | End: 2021-05-27

## 2021-05-25 RX ORDER — CITRIC ACID/SODIUM CITRATE 300-500 MG
30 SOLUTION, ORAL ORAL ONCE
Refills: 0 | Status: DISCONTINUED | OUTPATIENT
Start: 2021-05-25 | End: 2021-05-25

## 2021-05-25 RX ORDER — TETANUS TOXOID, REDUCED DIPHTHERIA TOXOID AND ACELLULAR PERTUSSIS VACCINE, ADSORBED 5; 2.5; 8; 8; 2.5 [IU]/.5ML; [IU]/.5ML; UG/.5ML; UG/.5ML; UG/.5ML
0.5 SUSPENSION INTRAMUSCULAR ONCE
Refills: 0 | Status: DISCONTINUED | OUTPATIENT
Start: 2021-05-25 | End: 2021-05-27

## 2021-05-25 RX ORDER — MAGNESIUM HYDROXIDE 400 MG/1
30 TABLET, CHEWABLE ORAL
Refills: 0 | Status: DISCONTINUED | OUTPATIENT
Start: 2021-05-25 | End: 2021-05-27

## 2021-05-25 RX ORDER — SIMETHICONE 80 MG/1
80 TABLET, CHEWABLE ORAL EVERY 4 HOURS
Refills: 0 | Status: DISCONTINUED | OUTPATIENT
Start: 2021-05-25 | End: 2021-05-27

## 2021-05-25 RX ORDER — DIPHENOXYLATE HCL/ATROPINE 2.5-.025MG
1 TABLET ORAL ONCE
Refills: 0 | Status: DISCONTINUED | OUTPATIENT
Start: 2021-05-25 | End: 2021-05-25

## 2021-05-25 RX ORDER — HYDROCORTISONE 1 %
1 OINTMENT (GRAM) TOPICAL EVERY 6 HOURS
Refills: 0 | Status: DISCONTINUED | OUTPATIENT
Start: 2021-05-25 | End: 2021-05-27

## 2021-05-25 RX ORDER — ACETAMINOPHEN 500 MG
975 TABLET ORAL
Refills: 0 | Status: DISCONTINUED | OUTPATIENT
Start: 2021-05-25 | End: 2021-05-27

## 2021-05-25 RX ORDER — OXYTOCIN 10 UNIT/ML
VIAL (ML) INJECTION
Qty: 30 | Refills: 0 | Status: DISCONTINUED | OUTPATIENT
Start: 2021-05-25 | End: 2021-05-25

## 2021-05-25 RX ORDER — CARBOPROST TROMETHAMINE 250 UG/ML
250 INJECTION, SOLUTION INTRAMUSCULAR ONCE
Refills: 0 | Status: COMPLETED | OUTPATIENT
Start: 2021-05-25 | End: 2021-05-25

## 2021-05-25 RX ORDER — BENZOCAINE 10 %
1 GEL (GRAM) MUCOUS MEMBRANE EVERY 6 HOURS
Refills: 0 | Status: DISCONTINUED | OUTPATIENT
Start: 2021-05-25 | End: 2021-05-27

## 2021-05-25 RX ORDER — OXYCODONE HYDROCHLORIDE 5 MG/1
5 TABLET ORAL ONCE
Refills: 0 | Status: DISCONTINUED | OUTPATIENT
Start: 2021-05-25 | End: 2021-05-27

## 2021-05-25 RX ORDER — KETOROLAC TROMETHAMINE 30 MG/ML
30 SYRINGE (ML) INJECTION ONCE
Refills: 0 | Status: DISCONTINUED | OUTPATIENT
Start: 2021-05-25 | End: 2021-05-27

## 2021-05-25 RX ADMIN — Medication 975 MILLIGRAM(S): at 21:45

## 2021-05-25 RX ADMIN — Medication 0.2 MILLIGRAM(S): at 15:37

## 2021-05-25 RX ADMIN — Medication 975 MILLIGRAM(S): at 22:15

## 2021-05-25 RX ADMIN — Medication 1000 MILLIUNIT(S)/MIN: at 15:30

## 2021-05-25 RX ADMIN — SODIUM CHLORIDE 125 MILLILITER(S): 9 INJECTION, SOLUTION INTRAVENOUS at 06:39

## 2021-05-25 RX ADMIN — CARBOPROST TROMETHAMINE 250 MICROGRAM(S): 250 INJECTION, SOLUTION INTRAMUSCULAR at 16:02

## 2021-05-25 RX ADMIN — Medication 1 TABLET(S): at 16:02

## 2021-05-25 RX ADMIN — Medication 1000 MILLIUNIT(S)/MIN: at 19:25

## 2021-05-25 RX ADMIN — Medication 2 MILLIUNIT(S)/MIN: at 08:17

## 2021-05-25 NOTE — OB PROVIDER DELIVERY SUMMARY - NS_DELIVERYATTENDING1_OBGYN_ALL_OB_FT
the buttocks and perineum; slight papular rash across the flanks and back, which has come and gone (not sure if fungal, miliaria, contact dermatitis, etc)  Stable mild-mod LLE stage 2 lymphedema; RLE edema mild and stable recently  Chetna-ulcer skin: generally pink and indurated, very mild erythema at back and ischia, mild purple discoloration and slight friction changes with mild focal denudation at knee; pink, mild induration and minimal hyperkeratosis at left toe and heel. Ulcer(s): T-spine ulcer red, inflamed, granular, stable exposed hardware; left and right ischial ulcers tiny, red, granular, healthy; right knee slowly smaller, deep red, hypergranular; left great toe more granular, less superficial necrotic debris; left heel a bit smaller, pink, granular, a bit of fibrin. Photos also saved in electronic chart.     Today's wound measurements, per RN documentation:  Wound 04/10/19 #49, Right Knee Anterior - DWLE, Mccauley 2, (onset 2018), gradually appeared-Wound Length (cm): 1 cm  Wound 12/11/19 #56, Left Great Toe, Diabetic, Mccauley 1, Onset 12/6/19-Wound Length (cm): 1 cm  Wound 08/16/17 #27, Thoracic spine CLUSTER, dehisced surgical wound, full thickness, onset 8/16/2017-Wound Length (cm): 5 cm  Wound 10/17/14 #8, Right ischium, stage 4 pressure ulcer, onset 7/15/14 -Wound Length (cm): 1 cm  Wound 12/11/19 #57, Left Heel, Diabetic ulcer, Mccauley 1, Onset 12/6/19-Wound Length (cm): 0.8 cm    Wound 04/10/19 #49, Right Knee Anterior - DWLE, Mccauley 2, (onset 2018), gradually appeared-Wound Width (cm): 0.5 cm  Wound 12/11/19 #56, Left Great Toe, Diabetic, Mccauley 1, Onset 12/6/19-Wound Width (cm): 0.7 cm  Wound 08/16/17 #27, Thoracic spine CLUSTER, dehisced surgical wound, full thickness, onset 8/16/2017-Wound Width (cm): 2.5 cm  Wound 10/17/14 #8, Right ischium, stage 4 pressure ulcer, onset 7/15/14 -Wound Width (cm): 0.1 cm  Wound 12/11/19 #57, Left Heel, Diabetic ulcer, Mccauley 1, Onset 12/6/19-Wound Width (cm): 0.2
cm    Wound 04/10/19 #49, Right Knee Anterior - DWLE, Mccauley 2, (onset 2018), gradually appeared-Wound Depth (cm): 0.1 cm  Wound 12/11/19 #56, Left Great Toe, Diabetic, Mccauley 1, Onset 12/6/19-Wound Depth (cm): 0.1 cm  Wound 08/16/17 #27, Thoracic spine CLUSTER, dehisced surgical wound, full thickness, onset 8/16/2017-Wound Depth (cm): 0.6 cm  Wound 10/17/14 #8, Right ischium, stage 4 pressure ulcer, onset 7/15/14 -Wound Depth (cm): 0.1 cm  Wound 12/11/19 #57, Left Heel, Diabetic ulcer, Mccauley 1, Onset 12/6/19-Wound Depth (cm): 0.1 cm    Assessment:     Patient Active Problem List   Diagnosis Code    Mixed hyperlipidemia E78.2    Coronary artery disease involving native coronary artery of native heart without angina pectoris I25.10    Paraplegia, complete ([facility]) G82.21    Chronic back pain M54.9, G89.29    Arthritis M19.90    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
presumed chronic osteo. I do think we can get his dermatitis calmed down this week, continue to work on offloading and gentle compression for the lower extremity ulcers. Factors contributing to occurrence and/or persistence of the chronic ulcer include lymphedema, diabetes, chronic pressure, decreased mobility, shear force and obesity. Medical necessity of today's visit is shown by the above documentation. Sharp debridement is indicated today, based upon the exam findings in the wound(s) above. Procedure note:     Consent obtained. Time out performed per [facility] policy. Anesthetic  Anesthetic: 4% Lidocaine Cream     Using a curette, I sharply debrided the foot (left , heel) and left, great toe ulcer(s) down through and including the removal of dermis. The type(s) of tissue debrided included fibrin and necrotic/eschar. Total Surface Area Debrided: 1 sq cm. The ulcers were then irrigated with normal saline solution. The procedure was completed with a small amount of bleeding, and hemostasis was with pressure. The patient tolerated the procedure well, with no significant complications. The patient's level of pain during and after the procedure was monitored. Post-debridement measurements, if different from pre-debridement, are in the flowsheet as well.  ________________    To encourage better epithelial cell coverage, I did use AgNO3 to chemically cauterize hypergranulation tissue on the right knee ulcer(s), after application of 4% lidocaine topical solution. This was tolerated well, with no pain or skin injury. Discharge plan:     Treatment in the wound care center today, per RN documentation: Wound 04/10/19 #49, Right Knee Anterior - GUANACO, Mccauley 2, (onset 2018), gradually appeared-Dressing/Treatment: (vashe triamcinolone opticell ag) -- after a couple of periwound Unna patches, and then foam padding to offload the knee itself, I then rewrapped the RLE with cast padding, Ace wrap, Spandagrip.    Wound
12/11/19 #56, Left Great Toe, Diabetic, Mccauley 1, Onset 12/6/19-Dressing/Treatment: (triad antibiotic ointment bandaid)  Wound 08/16/17 #27, Thoracic spine CLUSTER, dehisced surgical wound, full thickness, onset 8/16/2017-Dressing/Treatment: (vashe calmoseptine silver alginate drawtex mepilex border)  Wound 10/17/14 #8, Right ischium, stage 4 pressure ulcer, onset 7/15/14 -Dressing/Treatment: (antibiotic ointment silver alginate calmoseptine)  Wound 12/11/19 #57, Left Heel, Diabetic ulcer, Mccauley 1, Onset 12/6/19-Dressing/Treatment: (vashe hydrogel 4x4's  unna boot coban). Per physician order, left application of Unna boot was performed in the wound care center today, to help manage edema, stasis dermatitis, and/or venous ulcers. Leave primary dressing, Unna boot and secondary layer(s) in place until the next appointment. Also discussed ways to keep the wrap dry for a shower, including a plastic cast-guard, available in retail pharmacies. Decided on an Unna boot this week in place of a typical multilayer elastic wrap this week, to see if that can help to heal a couple of fragile areas of borderline skin breakdown, and then I used a few inches of the FastPay as a primary dressing layer over the right knee periwound as well. I reminded the patient of the importance of weight management and smoking cessation, if applicable; also encouraged ambulation as tolerated, additional lower extremity exercises as instructed in our education sheet, leg elevation when at rest, and compliance with any recommended dietary, diuretic and compression therapies. I've not heard back from Dr. Domi Dickerson office about a 2nd spinal surgery opinion, so will ask . Darling Harrison to give them a call and see if they are willing to schedule a consult visit. Called in a week of 200mg Diflucan daily for his rash. Keep focused on glucose control, protein intake, offloading, minimal chair time.   Await word on a new bed and mattress, and
formal denial of a Farrow wrap for his LLE. Home treatment: good handwashing before and after any dressing changes. Cleanse wound with saline or soap & water before dressing change. May use Vaseline (petrolatum), Aquaphor, Aveeno, CeraVe, Cetaphil, Eucerin, Lubriderm, etc for dry skin. Dressing type for home: as above for the left great toe, ischia, and T-spine, three times weekly. Written discharge instructions given to patient. Follow up in 1 week.     Electronically signed by Rey Bernheim, MD on 1/27/2020 at 2:06 PM.
Troy Messina DO
D/w pt plan for right therapeutic mastectomy, left prophylactic mastectomy, b/l SLND.    Discussed r/b/a post op expections poss complications.      Pt understands and agrees to proceed.    PMH/PSH/FH/SH:   Past Medical History:  Asthma    DM (diabetes mellitus)    HLD (hyperlipidemia)  no meds  HTN (hypertension)    Obesity.    Past Surgical History:  H/O myomectomy.

## 2021-05-25 NOTE — OB PROVIDER LABOR PROGRESS NOTE - NS_SUBJECTIVE/OBJECTIVE_OBGYN_ALL_OB_FT
Patient seen and examined at bedside. She is doing well. No complaints at this time.   Vital Signs Last 24 Hrs  T(C): 37.0 (25 May 2021 07:27), Max: 37.0 (25 May 2021 07:27)  T(F): 98.6 (25 May 2021 07:27), Max: 98.6 (25 May 2021 07:27)  HR: 71 (25 May 2021 13:14) (64 - 83)  BP: 130/73 (25 May 2021 13:14) (112/67 - 136/77)  RR: 15 (25 May 2021 07:27) (14 - 15)  SpO2: 98% (25 May 2021 10:17) (98% - 100%)

## 2021-05-25 NOTE — OB PROVIDER H&P - HISTORY OF PRESENT ILLNESS
31 yo  at 40 weeks gestation presents to L&D for spontaneous ROM. Patient states that around 2:30am she had a large gush of fluid, described as mostly clear with slight pink hue. Patient noticed some discharge present when she arrived and changed into gown. Feeling occasional contractions. Denies pain at the moment. Pregnancy course has been uneventful.    Obhx: 1 spontaneous   Pmhx: Denies  Pshx: Denies  Meds: PNV  Allergies: Penicillin (rash)  Ultrasound: vertex, anterior placenta     HR: 83 (21 @ 05:27) (83 - 83)  BP: 116/90 (21 @ 05:27) (116/90 - 116/90)                       29 yo  at 40 weeks gestation presents to L&D for spontaneous ROM. Patient states that around 2:30am she had a large gush of fluid, described as mostly clear with slight pink hue. Patient noticed some discharge present when she arrived and changed into gown. Feeling occasional contractions. Denies pain at the moment. Patient denies vaginal bleeding and contractions. She endorses good fetal movement. Denies fevers, chills, nausea and vomiting. No other complaints at this time.     Pregnancy course is significant for:  1. RH negative     Obhx:   G1 SAB without D&C,   PGYN: -fibroids, -cysts, denies STD hx, denies abnormal PAP smears   Pmhx: Denies  Pshx: Denies  SH: Denies EtOH, tobacco and illicit drug use during this pregnancy; Feels safe at home   Meds: PNVs  Allergies: Penicillin (rash)    BMI:  Ultrasound: vertex, anterior placenta   EFW:    HR: 83 (21 @ 05:27) (83 - 83)  BP: 116/90 (21 @ 05:27) (116/90 - 116/)                       31 yo  at 40 weeks gestation presents to L&D for spontaneous ROM. Patient states that around 2:30am she had a large gush of fluid, described as mostly clear with slight pink hue. Patient noticed some discharge present when she arrived and changed into gown. Feeling occasional contractions. Denies pain at the moment. Patient denies vaginal bleeding. She endorses good fetal movement. Denies fevers, chills, nausea and vomiting. No other complaints at this time.     Pregnancy course is significant for:  1. RH negative     Obhx:   G1 SAB without D&C, 2020  PGYN: -fibroids, -cysts, denies STD hx, denies abnormal PAP smears   Pmhx: Denies  Pshx: Denies  SH: Denies EtOH, tobacco and illicit drug use during this pregnancy; Feels safe at home   Meds: PNVs  Allergies: Penicillin (rash)    BMI: 27  Ultrasound: vertex, posterior placenta ()  EFW: 3570g    GBS: Negative  HIV: NR  RPR: NR  HepB: NR  Rubella: Immune  Rubeola: Immune  ABO: O-

## 2021-05-25 NOTE — OB PROVIDER LABOR PROGRESS NOTE - ASSESSMENT
29yo  at 40 weeks GA here in labor.   -VSS  -Cat 1 tracing  -Nish irregularly  -Continue with pitocin

## 2021-05-25 NOTE — OB PROVIDER H&P - ASSESSMENT
29 yo  at 40 weeks gestation presents to L&D for spontaneous ROM about 3 hrs prior to arrival. No concerning features.    A/P:    -Vital signs stable  -Hgb: * ->labs pending   -Voiding, tolerating PO, bowel function nml   -Advance care as tolerated   -Dispo: Admit for anticipated    A/P: 29 yo  at 40 weeks gestation who is being admitted for SROM in early labor.   -Admit to L&D  -Consent  -Admission labs  -NPO, except ice chips   -IV fluids  -Labor: SROM@0230. Latent labor. Nish irregularly. Will continue with expectant management.    -Fetus: Cat I tracing. Continuous toco and fetal monitoring.   -GBS: Negative, no GBS ppx required   -Analgesia: Will desire an epidural   -DVT ppx: Ambulate and SCD's while in bed     Discussed with Dr. Greenfield

## 2021-05-25 NOTE — OB RN DELIVERY SUMMARY - NS_SEPSISRSKCALC_OBGYN_ALL_OB_FT
EOS calculated successfully. EOS Risk Factor: 0.5/1000 live births ([facility] national incidence); GA=40w;Temp=98.6; ROM=12.883; GBS='Negative'; Antibiotics='No antibiotics or any antibiotics < 2 hrs prior to birth'

## 2021-05-25 NOTE — OB RN PATIENT PROFILE - WEIGHT IN LBS
164 Purse String (Simple) Text: Given the location of the defect and the characteristics of the surrounding skin a purse string closure was deemed most appropriate.  Undermining was performed circumfirentially around the surgical defect.  A purse string suture was then placed and tightened.

## 2021-05-25 NOTE — OB RN DELIVERY SUMMARY - NSSELHIDDEN_OBGYN_ALL_OB_FT
[NS_DeliveryAttending1_OBGYN_ALL_OB_FT:[id number]],[NS_DeliveryAssist1_OBGYN_ALL_OB_FT:[id number]],[NS_DeliveryRN_OBGYN_ALL_OB_FT:[id number]]

## 2021-05-25 NOTE — OB PROVIDER H&P - NSHPPHYSICALEXAM_GEN_ALL_CORE
Gen: NAD, AOx3  CV: RRR  Pulm: CTAB  Breast: Nontender, non-engorged   Abdomen:  Gravid, nontender  Uterus:  Fundus above umbilcus  SVE:  +fluid present, 3cm, 70%, -3  Ext:  Non-tender and non-edematous Vital Signs Last 24 Hrs  T(C): 36.9 (25 May 2021 06:15), Max: 36.9 (25 May 2021 05:26)  T(F): 98.4 (25 May 2021 06:15), Max: 98.42 (25 May 2021 05:26)  HR: 83 (25 May 2021 06:15) (83 - 83)  BP: 116/90 (25 May 2021 06:15) (116/90 - 116/90)  RR: 14 (25 May 2021 06:15) (14 - 14)    Gen: NAD, AOx3  Abdomen:  soft, gravid   Ext: non-tender, non-edematous   Bedside sono: vertex   SVE: 3/70/-3  SSE: gross pooling    FHT: baseline 130s, moderate variability, +accels, -decels   Hagerman:  sameer irregularly

## 2021-05-25 NOTE — OB PROVIDER DELIVERY SUMMARY - NSPROVIDERDELIVERYNOTE_OBGYN_ALL_OB_FT
at 15:23 PM of a live female, 7lbs 2oz and Apgars 8/9. Delivered LUCHO, no nuchal cord, clear fluid. Infant's head delivered with maternal expulsive efforts. Shoulders delivered without difficulty followed by the rest of the body. Nose and mouth were bulb suctioned. Cord clamped and cut after delay. Samples obtained. Baby handed to patient. Placenta delivered spontaneously, intact, 3VC. Fundus firm, minimal bleeding. Perineum and vagina inspected – small 2nd degree perineal laceration repaired. Hemostasis noted. EBL 1182cc due to uterine atony. Stable postpartum hemorrhage called. Patient received hemabate x1, methergine x1. Uterine tone firm. Pt tolerated procedure well, in stable condition, recovering in LDR. Infant in LDR. Instrument/sponge count correct x 2 and confirmed by nurse.    Written on behalf of Dr. Messina. I was not a part of this delivery.

## 2021-05-25 NOTE — OB PROVIDER DELIVERY SUMMARY - NSSELHIDDEN_OBGYN_ALL_OB_FT
[NS_DeliveryAttending1_OBGYN_ALL_OB_FT:[id number]],[NS_DeliveryAssist1_OBGYN_ALL_OB_FT:[id number]],[NS_DeliveryRN_OBGYN_ALL_OB_FT:[id number]]

## 2021-05-25 NOTE — OB RN PATIENT PROFILE - WILL THE PATIENT ACCEPT THE PFIZER COVID-19 VACCINE IF ELIGIBLE AND IT IS AVAILABLE?
No

## 2021-05-26 ENCOUNTER — TRANSCRIPTION ENCOUNTER (OUTPATIENT)
Age: 31
End: 2021-05-26

## 2021-05-26 LAB
HCT VFR BLD CALC: 25.1 % — LOW (ref 34.5–45)
HGB BLD-MCNC: 8.3 G/DL — LOW (ref 11.5–15.5)
MCHC RBC-ENTMCNC: 29.9 PG — SIGNIFICANT CHANGE UP (ref 27–34)
MCHC RBC-ENTMCNC: 33.1 GM/DL — SIGNIFICANT CHANGE UP (ref 32–36)
MCV RBC AUTO: 90.3 FL — SIGNIFICANT CHANGE UP (ref 80–100)
PLATELET # BLD AUTO: 98 K/UL — LOW (ref 150–400)
RBC # BLD: 2.78 M/UL — LOW (ref 3.8–5.2)
RBC # FLD: 13.4 % — SIGNIFICANT CHANGE UP (ref 10.3–14.5)
WBC # BLD: 12.81 K/UL — HIGH (ref 3.8–10.5)
WBC # FLD AUTO: 12.81 K/UL — HIGH (ref 3.8–10.5)

## 2021-05-26 RX ORDER — ACETAMINOPHEN 500 MG
3 TABLET ORAL
Qty: 360 | Refills: 0
Start: 2021-05-26 | End: 2021-06-24

## 2021-05-26 RX ORDER — IBUPROFEN 200 MG
3 TABLET ORAL
Qty: 168 | Refills: 0
Start: 2021-05-26 | End: 2021-06-08

## 2021-05-26 RX ADMIN — Medication 600 MILLIGRAM(S): at 18:45

## 2021-05-26 RX ADMIN — Medication 600 MILLIGRAM(S): at 00:10

## 2021-05-26 RX ADMIN — Medication 1 TABLET(S): at 11:52

## 2021-05-26 RX ADMIN — Medication 600 MILLIGRAM(S): at 17:45

## 2021-05-26 RX ADMIN — SODIUM CHLORIDE 3 MILLILITER(S): 9 INJECTION INTRAMUSCULAR; INTRAVENOUS; SUBCUTANEOUS at 13:29

## 2021-05-26 RX ADMIN — Medication 600 MILLIGRAM(S): at 00:40

## 2021-05-26 RX ADMIN — Medication 600 MILLIGRAM(S): at 23:51

## 2021-05-26 RX ADMIN — Medication 975 MILLIGRAM(S): at 21:30

## 2021-05-26 RX ADMIN — Medication 600 MILLIGRAM(S): at 12:50

## 2021-05-26 RX ADMIN — Medication 975 MILLIGRAM(S): at 20:56

## 2021-05-26 RX ADMIN — Medication 600 MILLIGRAM(S): at 11:52

## 2021-05-26 RX ADMIN — Medication 975 MILLIGRAM(S): at 04:15

## 2021-05-26 RX ADMIN — Medication 975 MILLIGRAM(S): at 03:45

## 2021-05-26 RX ADMIN — Medication 975 MILLIGRAM(S): at 15:12

## 2021-05-26 RX ADMIN — Medication 975 MILLIGRAM(S): at 09:01

## 2021-05-26 RX ADMIN — Medication 975 MILLIGRAM(S): at 16:10

## 2021-05-26 RX ADMIN — Medication 975 MILLIGRAM(S): at 10:00

## 2021-05-26 RX ADMIN — SODIUM CHLORIDE 3 MILLILITER(S): 9 INJECTION INTRAMUSCULAR; INTRAVENOUS; SUBCUTANEOUS at 00:00

## 2021-05-26 NOTE — DISCHARGE NOTE OB - HOSPITAL COURSE
delivered via spontaneous vaginal delivery complicated by postpartum hemorrhage, receiving methergine x1 + hemabate x1. She was transferred to postpartum unit without complications during her stay. Upon discharge she is voiding, tolerating PO, ambulating, and pain is controlled.

## 2021-05-26 NOTE — DISCHARGE NOTE OB - PLAN OF CARE
delivered via spontaneous vaginal delivery. She was transferred to postpartum unit without complications during her stay. Upon discharge she is voiding, tolerating PO, ambulating, and pain is controlled. recovery

## 2021-05-26 NOTE — CHART NOTE - NSCHARTNOTEFT_GEN_A_CORE
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  Patient is blood group O Rh(D) negative, and she received pre-jorje RhIG in 2021. Blood sample on 21 has a positive antibody screen, with anti-D identified in patient's plasma. Anti-D is most likely due to prior administration of RhIG. Passive Anti-D due to Rh Immune Globulin administration is not implicated in Hemolytic Transfusion Reactions or Hemolytic Disease of the Fetus and Woodman. Passive Anti-D can cross the placenta and cause a positive BUDDY in a . If transfusion is clinically indicated, and Rh(D) negative blood is available in blood bank inventory, the patient should receive Rh(D) negative blood, crossmatch compatible through the G phase of testing until the RhIG is no longer detectable in the patient’s plasma.
Vital Signs Last 24 Hrs  T(C): 36.8 (25 May 2021 07:27), Max: 36.9 (25 May 2021 05:26)  T(F): 98.24 (25 May 2021 07:27), Max: 98.42 (25 May 2021 05:26)  HR: 68 (25 May 2021 07:27) (68 - 83)  BP: 129/79 (25 May 2021 07:27) (116/90 - 129/79)  BP(mean): --  RR: 16 (25 May 2021 07:27) (14 - 16)  SpO2: --    FETAL HEART RATE   Baseline: 130  Variability: [  ] absent [  ] minimal [ x ] moderate [  ] marked  Accelerations: [ x ] present 15x15 or higher [  ] present 10x10 only  [  ] absent     DECELERATIONS:  [ x ] Absent  [  ] Early  [  ] Variable                 [  ] Late present:  [ ] </= 2 decelerations in 30 min  [ ] more than 2 decelerations in 30 min  [  ] Prolonged: [ ] present [ ] absent    UTERINE CONTRACTIONS:  [ x ] present      [  ] absent  Frequency     [ x ] 3 or more in 10 minutes        [  ] less than 3 in 10 minutes    CERVICAL EXAM:  Dilation: 4 cm  Effacement: 80%  Station: 0    [  ] AROM  [ x ] SROM               [ x ] clear               [  ] meconium stained                [  ] thick meconium     [  ] IUPC     [  ] FSE    IMPRESSION:   [  ] Normal labor course   [  ] Protracted/ Arrest in active phase   [  ] Protracted/ Arrest in second stage  FHR Category: 1  Additional:    INTERVENTIONS:  [ x ] Start Pitocin  [  ] Continue Pitocin [  ] Increase Pitocin    [  ] Decrease Pitocin   [  ] Discontinue Pitocin    [  ]  Section  Cervical Ripening:     [  ] Start Cytotec    [  ] Stop Cytotec  Additional:

## 2021-05-26 NOTE — DISCHARGE NOTE OB - CARE PROVIDER_API CALL
Troy Messina (DO)  Obstetrics and Gynecology  [address] [facility], [address] [place] [address]  Phone: [phone]  Fax: [phone]  Follow Up Time: 1 month

## 2021-05-26 NOTE — DISCHARGE NOTE OB - PATIENT PORTAL LINK FT
You can access the FollowMyHealth Patient Portal offered by [facility] by registering at the following website: [email or web address]. By joining [facility]’s FollowMyHealth portal, you will also be able to view your health information using other applications (apps) compatible with our system.

## 2021-05-26 NOTE — DISCHARGE NOTE OB - MEDICATION SUMMARY - MEDICATIONS TO TAKE
I will START or STAY ON the medications listed below when I get home from the hospital:    ibuprofen 200 mg oral tablet  -- 3 tab(s) by mouth every 6 hours, As Needed -for mild pain. Alternate every 3 hours with Tylenol if pain medication is needed.  -- Do not take this drug if you are pregnant.  It is very important that you take or use this exactly as directed.  Do not skip doses or discontinue unless directed by your doctor.  May cause drowsiness or dizziness.  Obtain medical advice before taking any non-prescription drugs as some may affect the action of this medication.  Take with food or milk.    -- Indication: For pain    Tylenol 325 mg oral tablet  -- 3 tab(s) by mouth every 6 hours, As Needed -for mild pain. Alternate every 3 hours with Ibuprofen if you need pain medications.  -- This product contains acetaminophen.  Do not use  with any other product containing acetaminophen to prevent possible liver damage.    -- Indication: For pain

## 2021-05-27 VITALS
TEMPERATURE: 98 F | RESPIRATION RATE: 17 BRPM | SYSTOLIC BLOOD PRESSURE: 102 MMHG | HEART RATE: 71 BPM | OXYGEN SATURATION: 98 % | DIASTOLIC BLOOD PRESSURE: 65 MMHG

## 2021-05-27 LAB
HCT VFR BLD CALC: 27.3 % — LOW (ref 34.5–45)
HGB BLD-MCNC: 9.1 G/DL — LOW (ref 11.5–15.5)
MCHC RBC-ENTMCNC: 30.1 PG — SIGNIFICANT CHANGE UP (ref 27–34)
MCHC RBC-ENTMCNC: 33.3 GM/DL — SIGNIFICANT CHANGE UP (ref 32–36)
MCV RBC AUTO: 90.4 FL — SIGNIFICANT CHANGE UP (ref 80–100)
PLATELET # BLD AUTO: 122 K/UL — LOW (ref 150–400)
RBC # BLD: 3.02 M/UL — LOW (ref 3.8–5.2)
RBC # FLD: 13.8 % — SIGNIFICANT CHANGE UP (ref 10.3–14.5)
WBC # BLD: 13.18 K/UL — HIGH (ref 3.8–10.5)
WBC # FLD AUTO: 13.18 K/UL — HIGH (ref 3.8–10.5)

## 2021-05-27 PROCEDURE — 86780 TREPONEMA PALLIDUM: CPT

## 2021-05-27 PROCEDURE — 85730 THROMBOPLASTIN TIME PARTIAL: CPT

## 2021-05-27 PROCEDURE — 85027 COMPLETE CBC AUTOMATED: CPT

## 2021-05-27 PROCEDURE — 86850 RBC ANTIBODY SCREEN: CPT

## 2021-05-27 PROCEDURE — 86900 BLOOD TYPING SEROLOGIC ABO: CPT

## 2021-05-27 PROCEDURE — U0003: CPT

## 2021-05-27 PROCEDURE — 86920 COMPATIBILITY TEST SPIN: CPT

## 2021-05-27 PROCEDURE — 86901 BLOOD TYPING SEROLOGIC RH(D): CPT

## 2021-05-27 PROCEDURE — 59025 FETAL NON-STRESS TEST: CPT

## 2021-05-27 PROCEDURE — 36415 COLL VENOUS BLD VENIPUNCTURE: CPT

## 2021-05-27 PROCEDURE — G0463: CPT

## 2021-05-27 PROCEDURE — 85610 PROTHROMBIN TIME: CPT

## 2021-05-27 PROCEDURE — 86880 COOMBS TEST DIRECT: CPT

## 2021-05-27 PROCEDURE — 59050 FETAL MONITOR W/REPORT: CPT

## 2021-05-27 PROCEDURE — 82962 GLUCOSE BLOOD TEST: CPT

## 2021-05-27 PROCEDURE — 86769 SARS-COV-2 COVID-19 ANTIBODY: CPT

## 2021-05-27 PROCEDURE — 86870 RBC ANTIBODY IDENTIFICATION: CPT

## 2021-05-27 PROCEDURE — U0005: CPT

## 2021-05-27 PROCEDURE — 85025 COMPLETE CBC W/AUTO DIFF WBC: CPT

## 2021-05-27 PROCEDURE — 85384 FIBRINOGEN ACTIVITY: CPT

## 2021-05-27 RX ADMIN — Medication 600 MILLIGRAM(S): at 06:06

## 2021-05-27 RX ADMIN — Medication 975 MILLIGRAM(S): at 08:41

## 2021-05-27 RX ADMIN — Medication 975 MILLIGRAM(S): at 09:30

## 2021-05-27 RX ADMIN — Medication 1 TABLET(S): at 11:48

## 2021-05-27 RX ADMIN — Medication 600 MILLIGRAM(S): at 11:49

## 2021-05-27 RX ADMIN — Medication 600 MILLIGRAM(S): at 05:23

## 2021-05-27 RX ADMIN — Medication 600 MILLIGRAM(S): at 00:30

## 2021-05-27 RX ADMIN — Medication 600 MILLIGRAM(S): at 12:30

## 2021-05-27 NOTE — PROGRESS NOTE ADULT - SUBJECTIVE AND OBJECTIVE BOX
Patient is a 29yo  now PPD#2 s/p spontaneous vaginal delivery complicate. Delivery c/b stable PPH QBL 1182, s/p methergine x1, hemabate x1    S:    No acute events overnight.  Pt seen and examined at bedside. Pt doing well.  Has no complaints.  Pain well controlled on current regiment.  Pt ambulating, tolerating PO, voiding w/o difficulty, +flatus/-BM.    O:    T(C): 36.6 (21 @ 05:00), Max: 36.6 (21 @ 05:00)  HR: 71 (21 @ 05:00) (65 - 71)  BP: 102/65 (21 @ 05:00) (102/65 - 110/72)  RR: 17 (21 @ 05:00) (17 - 18)  SpO2: 98% (21 @ 05:00) (98% - 100%)    Physical Exam  Gen: NAD, AAOx3  CV: rrr, s1/s2 present  Lung: CTABL  Breast: NT, non-engorged  Abdomen:  soft, NT, ND, BS+  Uterus:  firm below umbilicus  VE:  expectant lochia  Ext:  NT, nonedematous                          8.3    12.81 )-----------( 98       ( 26 May 2021 07:20 )             25.1             A/P:  29yo  now PPD#2 s/p spontaneous vaginal delivery complicate. Delivery c/b stable PPH QBL 1182, s/p methergine x1, hemabate x1  -Stable. AM CBC pending. Yesterday Hg 8.3, Plt 98  -Voiding, tolerating PO, bowel function nml   -Advance care as tolerated   -Continue routine postpartum/postoperative care and education.  -Pt encouraged to increase ambulation and PO intake  -D/C pending attending rounds and AM labs   Patient is a 29yo  now PPD#2 s/p spontaneous vaginal delivery complicate. Delivery c/b stable PPH QBL 1182, s/p methergine x1, hemabate x1    S:    No acute events overnight.  Pt seen and examined at bedside. Pt doing well.  Has no complaints.  Pain well controlled on current regiment.  Pt ambulating, tolerating PO, voiding w/o difficulty, +flatus/-BM.    O:    T(C): 36.6 (21 @ 05:00), Max: 36.6 (21 @ 05:00)  HR: 71 (21 @ 05:00) (65 - 71)  BP: 102/65 (21 @ 05:00) (102/65 - 110/72)  RR: 17 (21 @ 05:00) (17 - 18)  SpO2: 98% (21 @ 05:00) (98% - 100%)    Physical Exam  Gen: NAD, AAOx3  CV: rrr, s1/s2 present  Lung: CTABL  Breast: NT, non-engorged  Abdomen:  soft, NT, ND, BS+  Uterus:  firm below umbilicus  VE:  expectant lochia  Ext:  NT, nonedematous                          8.3    12.81 )-----------( 98       ( 26 May 2021 07:20 )             25.1

## 2021-05-27 NOTE — PROGRESS NOTE ADULT - ASSESSMENT
A/P: Patient is a 30y  s/p  now PPD1  Delivery c/b stable PPH QBL 1182, s/p methergine, hemabate  - Stable, doing well postpartum  - Hgb 10.7 > f/u AM CBC  - Pain: well controlled on PO pain meds  - GI: Regular diet  - : voiding  - DVT prophylaxis: ambulate  - Dispo: Continue inpatient care  
A/P:  31yo  now PPD#2 s/p spontaneous vaginal delivery complicate. Delivery c/b stable PPH QBL 1182, s/p methergine x1, hemabate x1  -Stable. AM CBC pending. Yesterday Hg 8.3, Plt 98  - f/u platelets   -Voiding, tolerating PO, bowel function nml   -Advance care as tolerated   -Continue routine postpartum/postoperative care and education.  -Pt encouraged to increase ambulation and PO intake  -D/C pending attending rounds and AM labs    I have read and agree with everything in the above note.     Tensiha Barclay- PGY4

## 2021-05-28 ENCOUNTER — APPOINTMENT (OUTPATIENT)
Dept: OBGYN | Facility: CLINIC | Age: 31
End: 2021-05-28
Payer: COMMERCIAL

## 2021-05-28 VITALS
WEIGHT: 143 LBS | DIASTOLIC BLOOD PRESSURE: 83 MMHG | HEIGHT: 65 IN | BODY MASS INDEX: 23.82 KG/M2 | SYSTOLIC BLOOD PRESSURE: 133 MMHG

## 2021-05-28 PROBLEM — O03.9 COMPLETE OR UNSPECIFIED SPONTANEOUS ABORTION WITHOUT COMPLICATION: Chronic | Status: ACTIVE | Noted: 2021-05-25

## 2021-05-28 PROCEDURE — 0503F POSTPARTUM CARE VISIT: CPT

## 2021-05-28 NOTE — HISTORY OF PRESENT ILLNESS
[Postpartum Follow Up] : postpartum follow up [] : delivered by vaginal delivery [S/Sx PP Depression] : no signs/symptoms of postpartum depression [Doing Well] : is doing well [No Sign of Infection] : is showing no signs of infection [Excellent Pain Control] : has excellent pain control [None] : None [FreeTextEntry8] : headaches.

## 2021-06-11 ENCOUNTER — NON-APPOINTMENT (OUTPATIENT)
Age: 31
End: 2021-06-11

## 2021-07-06 ENCOUNTER — APPOINTMENT (OUTPATIENT)
Dept: OBGYN | Facility: CLINIC | Age: 31
End: 2021-07-06
Payer: COMMERCIAL

## 2021-07-06 VITALS
HEIGHT: 65 IN | SYSTOLIC BLOOD PRESSURE: 120 MMHG | BODY MASS INDEX: 24.25 KG/M2 | DIASTOLIC BLOOD PRESSURE: 70 MMHG | WEIGHT: 145.56 LBS

## 2021-07-06 DIAGNOSIS — Z30.09 ENCOUNTER FOR OTHER GENERAL COUNSELING AND ADVICE ON CONTRACEPTION: ICD-10-CM

## 2021-07-06 PROCEDURE — 0503F POSTPARTUM CARE VISIT: CPT

## 2021-07-06 NOTE — HISTORY OF PRESENT ILLNESS
[Postpartum Follow Up] : postpartum follow up [] : delivered by vaginal delivery [Back to Normal] : is back to normal in size [Mild] : mild vaginal bleeding [Normal] : the vagina was normal [Not Done] : Examination of breasts not done [Doing Well] : is doing well [No Sign of Infection] : is showing no signs of infection [Excellent Pain Control] : has excellent pain control [None] : None [Intended Contraception] : Intended Contraception: [Oral Contraceptives] : oral contraceptives [Complications:___] : no complications [Breastfeeding] : not currently nursing [S/Sx PP Depression] : no signs/symptoms of postpartum depression [Cervix Sample Taken] : cervical sample not taken for a Pap smear

## 2021-07-07 DIAGNOSIS — Z87.09 PERSONAL HISTORY OF OTHER DISEASES OF THE RESPIRATORY SYSTEM: ICD-10-CM

## 2021-07-07 DIAGNOSIS — Z34.93 ENCOUNTER FOR SUPERVISION OF NORMAL PREGNANCY, UNSPECIFIED, THIRD TRIMESTER: ICD-10-CM

## 2021-09-27 ENCOUNTER — NON-APPOINTMENT (OUTPATIENT)
Age: 31
End: 2021-09-27

## 2021-11-09 DIAGNOSIS — B37.3 CANDIDIASIS OF VULVA AND VAGINA: ICD-10-CM

## 2021-11-11 ENCOUNTER — APPOINTMENT (OUTPATIENT)
Dept: OBGYN | Facility: CLINIC | Age: 31
End: 2021-11-11
Payer: COMMERCIAL

## 2021-11-11 VITALS
WEIGHT: 148.31 LBS | DIASTOLIC BLOOD PRESSURE: 70 MMHG | BODY MASS INDEX: 24.71 KG/M2 | HEIGHT: 65 IN | SYSTOLIC BLOOD PRESSURE: 120 MMHG

## 2021-11-11 DIAGNOSIS — Z01.419 ENCOUNTER FOR GYNECOLOGICAL EXAMINATION (GENERAL) (ROUTINE) W/OUT ABNORMAL FINDINGS: ICD-10-CM

## 2021-11-11 PROCEDURE — 99395 PREV VISIT EST AGE 18-39: CPT

## 2021-11-11 RX ORDER — PRENATAL VIT NO.130/IRON/FOLIC 27MG-0.8MG
TABLET ORAL
Refills: 0 | Status: DISCONTINUED | COMMUNITY
End: 2021-11-11

## 2021-11-11 RX ORDER — AZITHROMYCIN 250 MG/1
250 TABLET, FILM COATED ORAL
Qty: 1 | Refills: 0 | Status: DISCONTINUED | COMMUNITY
Start: 2021-04-20 | End: 2021-11-11

## 2021-11-11 RX ORDER — FLUCONAZOLE 150 MG/1
150 TABLET ORAL
Qty: 2 | Refills: 0 | Status: DISCONTINUED | COMMUNITY
Start: 2021-11-09 | End: 2021-11-11

## 2021-11-11 NOTE — REASON FOR VISIT
[Annual] : an annual visit. [FreeTextEntry2] : The patient presents for a refill of oral contraceptives.

## 2021-11-11 NOTE — PHYSICAL EXAM
[Chaperone Present] : A chaperone was present in the examining room during all aspects of the physical examination [Appropriately responsive] : appropriately responsive [Alert] : alert [No Acute Distress] : no acute distress [Soft] : soft [Non-tender] : non-tender [Non-distended] : non-distended [No HSM] : No HSM [No Lesions] : no lesions [No Mass] : no mass [Oriented x3] : oriented x3 [Examination Of The Breasts] : a normal appearance [No Masses] : no breast masses were palpable [Labia Majora] : normal [Labia Minora] : normal [Discharge] : discharge [Moderate] : moderate [Normal] : normal [Uterine Adnexae] : normal

## 2021-11-11 NOTE — HISTORY OF PRESENT ILLNESS
[Oral Contraceptive] : uses oral contraception pills [Y] : Patient is sexually active [Frequency: Q ___ days] : menstrual periods occur approximately every [unfilled] days [Menarche Age: ____] : age at menarche was [unfilled] [PGHxTotal] : 2 [[facility]] : 1 [PGHxPremature] : 0 [PGHxAbortions] : 0 [[facility] Hospitaliving] : 1 [PGHxABInduced] : 0 [PGHxABSpont] : 1 [PGHxEctopic] : 0 [PGHxMultBirths] : 0

## 2021-11-15 LAB
CANDIDA VAG CYTO: NOT DETECTED
G VAGINALIS+PREV SP MTYP VAG QL MICRO: NOT DETECTED
HPV HIGH+LOW RISK DNA PNL CVX: NOT DETECTED
T VAGINALIS VAG QL WET PREP: NOT DETECTED

## 2021-11-16 LAB — CYTOLOGY CVX/VAG DOC THIN PREP: NORMAL

## 2021-12-21 RX ORDER — NORETHINDRONE ACETATE AND ETHINYL ESTRADIOL AND FERROUS FUMARATE 1MG-20(21)
1-20 KIT ORAL
Qty: 3 | Refills: 1 | Status: COMPLETED | COMMUNITY
Start: 2021-07-06 | End: 2021-12-21

## 2021-12-23 DIAGNOSIS — B37.3 CANDIDIASIS OF VULVA AND VAGINA: ICD-10-CM

## 2022-02-24 DIAGNOSIS — N39.0 URINARY TRACT INFECTION, SITE NOT SPECIFIED: ICD-10-CM

## 2022-03-31 NOTE — OB PROVIDER H&P - NS_ABDFINDING_OBGYN_ALL_OB
Referred by: Robyn Galeas MD; Medical Diagnosis (from order):    Diagnosis Information      Diagnosis    V58.89, 842.00 (ICD-9-CM) - S63.501D (ICD-10-CM) - Sprain of right wrist, subsequent encounter                Daily Treatment Note    Visit: 13    SUBJECTIVE                                                                                                               Visit # 13    Forearm is stiff.    OBJECTIVE                                                                                                                        TREATMENT                                                                                                                  Therapeutic Exercise:  - PROM thumb composite flexion for soft tissue mobility 10x   - towel thumb adduction AROM 20x   - medium flexbar eccentric extension flexion 3x 10  - medium flexbar grasp pronation supination 3x 10 - conscious thumb inclusion into  around flexbar   - 6 lb graded pinch  cubes with 3pt pinch  - 4, 6, 8 lb graded pinch 3 pt pinch transfer to vertical bar               Manual Therapy:  IASTM dorsal forearm extensors and thumb for tightness and to increase extensibility       ASSESSMENT                                                                                                             Soft tissue tightness palpated at dorsal forearm would continue to benefit from manual mobilization.  Progressing with resistance pinching involving thumb.         Therapy procedure time and total treatment time can be found documented on the Time Entry flowsheet   Soft, nontender

## 2022-05-10 ENCOUNTER — RX RENEWAL (OUTPATIENT)
Age: 32
End: 2022-05-10

## 2022-05-18 DIAGNOSIS — B37.3 CANDIDIASIS OF VULVA AND VAGINA: ICD-10-CM

## 2022-07-07 ENCOUNTER — APPOINTMENT (OUTPATIENT)
Dept: OBGYN | Facility: CLINIC | Age: 32
End: 2022-07-07

## 2022-07-07 VITALS
BODY MASS INDEX: 22.99 KG/M2 | SYSTOLIC BLOOD PRESSURE: 114 MMHG | HEIGHT: 65 IN | WEIGHT: 138 LBS | HEART RATE: 70 BPM | DIASTOLIC BLOOD PRESSURE: 80 MMHG

## 2022-07-07 PROCEDURE — 99213 OFFICE O/P EST LOW 20 MIN: CPT

## 2022-07-07 NOTE — HISTORY OF PRESENT ILLNESS
[Oral Contraceptive] : uses oral contraception pills [Y] : Positive pregnancy history [Regular Cycle Intervals] : periods have been regular [Frequency: Q ___ days] : menstrual periods occur approximately every [unfilled] days [Menarche Age: ____] : age at menarche was [unfilled] [PGHxTotal] : 2 [[facility]] : 1 [PGHxPremature] : 0 [PGHxAbortions] : 1 [[facility] [place]iving] : 1 [PGHxABInduced] : 0 [PGHxABSpont] : 1 [PGHxEctopic] : 0 [PGHxMultBirths] : 0

## 2022-07-22 ENCOUNTER — APPOINTMENT (OUTPATIENT)
Dept: OBGYN | Facility: CLINIC | Age: 32
End: 2022-07-22

## 2022-07-22 VITALS
HEIGHT: 65 IN | WEIGHT: 139.7 LBS | DIASTOLIC BLOOD PRESSURE: 74 MMHG | BODY MASS INDEX: 23.28 KG/M2 | SYSTOLIC BLOOD PRESSURE: 122 MMHG

## 2022-07-22 DIAGNOSIS — Z86.19 PERSONAL HISTORY OF OTHER INFECTIOUS AND PARASITIC DISEASES: ICD-10-CM

## 2022-07-22 DIAGNOSIS — B37.3 CANDIDIASIS OF VULVA AND VAGINA: ICD-10-CM

## 2022-07-22 PROCEDURE — 99213 OFFICE O/P EST LOW 20 MIN: CPT

## 2022-07-22 NOTE — CHIEF COMPLAINT
[Urgent Visit] : Urgent Visit [FreeTextEntry1] : The patient presents complaining of vaginal discharge and itching.

## 2022-07-22 NOTE — PHYSICAL EXAM
[Chaperone Present] : A chaperone was present in the examining room during all aspects of the physical examination [Awake] : awake [Alert] : alert [Acute Distress] : no acute distress [Mass] : no breast mass [Nipple Discharge] : no nipple discharge [Axillary LAD] : no axillary lymphadenopathy [Soft] : soft [Tender] : non tender [Oriented x3] : oriented to person, place, and time [Normal] : uterus [Discharge] : a  ~M vaginal discharge was present [Moderate] : moderate [Thick] : thick [No Bleeding] : there was no active vaginal bleeding [Uterine Adnexae] : were not tender and not enlarged

## 2022-07-25 LAB
CANDIDA VAG CYTO: NOT DETECTED
G VAGINALIS+PREV SP MTYP VAG QL MICRO: NOT DETECTED
T VAGINALIS VAG QL WET PREP: NOT DETECTED

## 2022-10-28 ENCOUNTER — RX RENEWAL (OUTPATIENT)
Age: 32
End: 2022-10-28

## 2022-10-29 ENCOUNTER — RX RENEWAL (OUTPATIENT)
Age: 32
End: 2022-10-29

## 2022-11-01 ENCOUNTER — APPOINTMENT (OUTPATIENT)
Dept: OBGYN | Facility: CLINIC | Age: 32
End: 2022-11-01

## 2022-11-01 ENCOUNTER — RX RENEWAL (OUTPATIENT)
Age: 32
End: 2022-11-01

## 2022-11-01 VITALS
DIASTOLIC BLOOD PRESSURE: 80 MMHG | WEIGHT: 140 LBS | SYSTOLIC BLOOD PRESSURE: 118 MMHG | HEIGHT: 65 IN | BODY MASS INDEX: 23.32 KG/M2

## 2022-11-01 DIAGNOSIS — N90.89 OTHER SPECIFIED NONINFLAMMATORY DISORDERS OF VULVA AND PERINEUM: ICD-10-CM

## 2022-11-01 DIAGNOSIS — N89.8 OTHER SPECIFIED NONINFLAMMATORY DISORDERS OF VAGINA: ICD-10-CM

## 2022-11-01 PROCEDURE — 99213 OFFICE O/P EST LOW 20 MIN: CPT

## 2022-11-01 NOTE — PHYSICAL EXAM
[Chaperone Present] : A chaperone was present in the examining room during all aspects of the physical examination [Awake] : awake [Alert] : alert [Acute Distress] : no acute distress [Soft] : soft [Tender] : non tender [Oriented x3] : oriented to person, place, and time [Normal] : uterus [Multiple Fissures] : multiple [Discharge] : a  ~M vaginal discharge was present [Moderate] : moderate [No Bleeding] : there was no active vaginal bleeding [Uterine Adnexae] : were not tender and not enlarged

## 2022-11-01 NOTE — CHIEF COMPLAINT
[Urgent Visit] : Urgent Visit [FreeTextEntry1] : The patient presents complaining of recurrent vaginal discharge and painful vulvar lesions.

## 2022-11-03 LAB
CANDIDA VAG CYTO: NOT DETECTED
G VAGINALIS+PREV SP MTYP VAG QL MICRO: NOT DETECTED
HSV 1+2 IGG SER IA-IMP: NEGATIVE
HSV 1+2 IGG SER IA-IMP: NEGATIVE
HSV1 IGG SER QL: 0.26 INDEX
HSV2 IGG SER QL: 0.06 INDEX
T VAGINALIS VAG QL WET PREP: NOT DETECTED

## 2022-11-05 LAB
HHV SPEC CULT: NORMAL
HSV TYPE 1: NORMAL
HSV TYPE 2: NORMAL
HSV1 IGM SER QL: NEGATIVE
HSV2 AB FLD-ACNC: NEGATIVE

## 2022-11-11 DIAGNOSIS — B37.31 ACUTE CANDIDIASIS OF VULVA AND VAGINA: ICD-10-CM

## 2022-11-20 NOTE — OB RN PATIENT PROFILE - TERM DELIVERIES, OB PROFILE
Drink plenty of electrolyte-rich fluids (at least one gallon or more daily).  Limit/avoid caffeine (coffee, tea, coke, Dr AlonsoPepper, Mountain Dew, energy drinks, pre-workout supplements, etc.) and dairy intake while symptoms persist.    
0

## 2022-12-16 NOTE — OB RN TRIAGE NOTE - TEMPERATURE IN CELSIUS (DEGREES C)
Physician Progress Note      Christian Yi  CSN #:                  [id number]  :                       1960  ADMIT DATE:       12/15/2022 4:53 PM  DISCH DATE:  RESPONDING  PROVIDER #: Tiny Mao MD          QUERY TEXT:    Pt admitted with DKA. Pt noted to have confusion, lethargy, disorientation and   was noted to be obtunded on arrival. If possible, please document in the   progress notes and discharge summary if you are evaluating and / or treating   any of the following: The medical record reflects the following:  Risk Factors: DMII  Clinical Indicators: Rectal Temp: 92.5-93.5, -138, RR 21/27; Glucose   1,261, AGAP 41,  beta-Hydroxybutyrate 133.7, Na 133, K+ 5.4,  sCr/GFR 2.87/24,   VBG: pH 7.056, pCO2 26.2,  pO2 65, HCO3 7.3, TCO2 8, BE -23, sat 83; Nursing   Flowsheet: Impulsive, poor judgement, poor attention/concentration, poor   safety awareness, delayed speech response, lethargic, restless, Oriented to   person, time, situation, disoriented to place; 12/15 ICU Admit NN: \"Patient   answers orientation questions correctly, but is very lethargic and confused at   times. \" H&P: \"Patient was brought to the ED by  Treatment: ICU Care, IVFB 4L, IVF . 45NS at 250ml/hr, Insulin gtt, DKA   protocols, Neuro assessments, labs and monitoring    Thank you,  Alma Grant   Clinical Documentation Improvement Specialist  W: [phone]  Options provided:  -- Metabolic encephalopathy  -- Other - I will add my own diagnosis  -- Disagree - Not applicable / Not valid  -- Disagree - Clinically unable to determine / Unknown  -- Refer to Clinical Documentation Reviewer    PROVIDER RESPONSE TEXT:    This patient has metabolic encephalopathy.     Query created by: Dashawn Schwartz on 2022 9:42 AM      Electronically signed by:  Tiny Mao MD 2022 1:42 PM 36.9

## 2022-12-21 ENCOUNTER — APPOINTMENT (OUTPATIENT)
Dept: OBGYN | Facility: CLINIC | Age: 32
End: 2022-12-21

## 2022-12-21 VITALS
SYSTOLIC BLOOD PRESSURE: 118 MMHG | BODY MASS INDEX: 22.99 KG/M2 | HEIGHT: 65 IN | WEIGHT: 138 LBS | DIASTOLIC BLOOD PRESSURE: 76 MMHG

## 2022-12-21 PROCEDURE — 99395 PREV VISIT EST AGE 18-39: CPT

## 2022-12-21 RX ORDER — NORETHINDRONE ACETATE AND ETHINYL ESTRADIOL AND FERROUS FUMARATE 1MG-20(21)
1-20 KIT ORAL DAILY
Qty: 3 | Refills: 0 | Status: DISCONTINUED | COMMUNITY
Start: 2022-07-07 | End: 2022-12-21

## 2022-12-21 RX ORDER — FLUCONAZOLE 150 MG/1
150 TABLET ORAL
Qty: 2 | Refills: 0 | Status: DISCONTINUED | COMMUNITY
Start: 2022-07-22 | End: 2022-12-21

## 2022-12-21 RX ORDER — FLUCONAZOLE 150 MG/1
150 TABLET ORAL
Qty: 2 | Refills: 1 | Status: DISCONTINUED | COMMUNITY
Start: 2021-12-23 | End: 2022-12-21

## 2022-12-21 RX ORDER — NORETHINDRONE ACETATE AND ETHINYL ESTRADIOL AND FERROUS FUMARATE 1MG-20(21)
1-20 KIT ORAL DAILY
Qty: 3 | Refills: 0 | Status: DISCONTINUED | COMMUNITY
Start: 2022-02-18 | End: 2022-12-21

## 2022-12-21 RX ORDER — TERCONAZOLE 8 MG/G
0.8 CREAM VAGINAL
Qty: 1 | Refills: 0 | Status: DISCONTINUED | COMMUNITY
Start: 2022-06-23 | End: 2022-12-21

## 2022-12-21 RX ORDER — FLUCONAZOLE 150 MG/1
150 TABLET ORAL
Qty: 2 | Refills: 0 | Status: DISCONTINUED | COMMUNITY
Start: 2022-05-18 | End: 2022-12-21

## 2022-12-21 RX ORDER — NITROFURANTOIN (MONOHYDRATE/MACROCRYSTALS) 25; 75 MG/1; MG/1
100 CAPSULE ORAL
Qty: 10 | Refills: 0 | Status: DISCONTINUED | COMMUNITY
Start: 2022-02-24 | End: 2022-12-21

## 2022-12-21 RX ORDER — FLUCONAZOLE 150 MG/1
150 TABLET ORAL
Qty: 2 | Refills: 0 | Status: DISCONTINUED | COMMUNITY
Start: 2022-11-11 | End: 2022-12-21

## 2022-12-21 NOTE — HISTORY OF PRESENT ILLNESS
[Oral Contraceptive] : uses oral contraception pills [Frequency: Q ___ days] : menstrual periods occur approximately every [unfilled] days [Menarche Age: ____] : age at menarche was [unfilled] [N] : Patient does not use contraception [Y] : Positive pregnancy history [Regular Cycle Intervals] : periods have been regular [LMPDate] : 12/12/22 [PGHxTotal] : 2 [[facility]xFulerm] : 1 [PGHxPremature] : 0 [PGHxAbortions] : 1 [[facility]iving] : 1 [PGHxABInduced] : 0 [PGHxABSpont] : 1 [PGHxEctopic] : 0 [PGHxMultBirths] : 0

## 2022-12-22 LAB — HPV HIGH+LOW RISK DNA PNL CVX: NOT DETECTED

## 2023-08-01 DIAGNOSIS — B37.31 ACUTE CANDIDIASIS OF VULVA AND VAGINA: ICD-10-CM

## 2023-12-19 RX ORDER — NORETHINDRONE ACETATE AND ETHINYL ESTRADIOL AND FERROUS FUMARATE 1MG-20(21)
1-20 KIT ORAL DAILY
Qty: 3 | Refills: 0 | Status: ACTIVE | COMMUNITY
Start: 2023-12-19 | End: 1900-01-01

## 2023-12-20 LAB — CYTOLOGY CVX/VAG DOC THIN PREP: NORMAL

## 2024-01-12 ENCOUNTER — APPOINTMENT (OUTPATIENT)
Dept: OBGYN | Facility: CLINIC | Age: 34
End: 2024-01-12
Payer: COMMERCIAL

## 2024-01-12 VITALS
SYSTOLIC BLOOD PRESSURE: 126 MMHG | BODY MASS INDEX: 23.71 KG/M2 | DIASTOLIC BLOOD PRESSURE: 70 MMHG | WEIGHT: 142.3 LBS | HEIGHT: 65 IN

## 2024-01-12 DIAGNOSIS — Z01.419 ENCOUNTER FOR GYNECOLOGICAL EXAMINATION (GENERAL) (ROUTINE) W/OUT ABNORMAL FINDINGS: ICD-10-CM

## 2024-01-12 DIAGNOSIS — Z30.41 ENCOUNTER FOR SURVEILLANCE OF CONTRACEPTIVE PILLS: ICD-10-CM

## 2024-01-12 PROCEDURE — 99395 PREV VISIT EST AGE 18-39: CPT

## 2024-01-12 RX ORDER — FLUCONAZOLE 150 MG/1
150 TABLET ORAL
Qty: 2 | Refills: 0 | Status: DISCONTINUED | COMMUNITY
Start: 2023-08-01 | End: 2024-01-12

## 2024-01-12 RX ORDER — FLUCONAZOLE 150 MG/1
150 TABLET ORAL
Qty: 2 | Refills: 1 | Status: DISCONTINUED | COMMUNITY
Start: 2023-04-25 | End: 2024-01-12

## 2024-01-12 RX ORDER — NORETHINDRONE ACETATE AND ETHINYL ESTRADIOL AND FERROUS FUMARATE 1MG-20(21)
1-20 KIT ORAL
Qty: 3 | Refills: 0 | Status: ACTIVE | COMMUNITY
Start: 2024-01-12 | End: 1900-01-01

## 2024-01-12 RX ORDER — FLUCONAZOLE 150 MG/1
150 TABLET ORAL
Qty: 2 | Refills: 3 | Status: DISCONTINUED | COMMUNITY
Start: 2022-12-30 | End: 2024-01-12

## 2024-01-12 NOTE — HISTORY OF PRESENT ILLNESS
[Oral Contraceptive] : uses oral contraception pills [Y] : Patient is sexually active [Frequency: Q ___ days] : menstrual periods occur approximately every [unfilled] days [Menarche Age: ____] : age at menarche was [unfilled] [PGHxTotal] : 2 [[facility]] : 1 [PGHxPremature] : 0 [PGHxAbortions] : 1 [[facility] Instituteiving] : 1 [PGHxABInduced] : 0 [PGHxABSpont] : 1 [PGHxEctopic] : 0 [PGHxMultBirths] : 0 [Regular Cycle Intervals] : periods have been regular

## 2024-01-12 NOTE — HISTORY OF PRESENT ILLNESS
[Oral Contraceptive] : uses oral contraception pills [Y] : Patient is sexually active [Frequency: Q ___ days] : menstrual periods occur approximately every [unfilled] days [Menarche Age: ____] : age at menarche was [unfilled] [PGHxTotal] : 2 [[facility]xF[facility]] : 1 [PGHxPremature] : 0 [PGHxAbortions] : 1 [[facility] Hospitaliving] : 1 [PGHxABInduced] : 0 [PGHxABSpont] : 1 [PGHxEctopic] : 0 [PGHxMultBirths] : 0 [Regular Cycle Intervals] : periods have been regular

## 2024-01-16 LAB — HPV HIGH+LOW RISK DNA PNL CVX: NOT DETECTED

## 2024-01-18 LAB — CYTOLOGY CVX/VAG DOC THIN PREP: NORMAL

## 2024-01-22 NOTE — OB PROVIDER DELIVERY SUMMARY - NSCOUNTCORRECT_OBGYN_ALL_OB
Left message for patient regarding appointment that was switched to virtual.  Stated that the patient could be seen in-person today at 3:30 if she was available.   Laps, needles and instruments count was reported as correct.

## 2024-02-12 NOTE — OB RN PATIENT PROFILE - NS TRANSFER PATIENT BELONGINGS
[facility]  Hospitalist Admission Note  Name: Charlette Rodgers    MRN: [id number]  YOB: 1954    Age: 69 year old  Date of admission: 2/11/2024  Primary care provider: Brian Payne    Chief Complaint: Diarrhea and hypertension.    Charlette Rodgers is a 69 year old female with past medical history of essential hypertension, hyperlipidemia and diabetes.    She ate some raw crab yesterday and has been having loose watery stools since then.  She estimates more than 20 stools today.  She has nausea with minimal emesis.  She also complains of mild diffuse abdominal pain but no fever rigors or chills.  She feels very dry and mildly dizzy.  Otherwise alert awake and oriented x 3.    Vitals on presentation: Temperature 98.2  F, heart rate 136/min, blood pressure of 96/66 which dropped down to 78/58, respiratory rate of 22 and oxygen saturation of 99% on room air.    Initial lactate was 5.6.  CMP showed BUN/creatinine of 29.9/0.9 with glucose of 293.  Ketones of 0.6.  WBC 5.8 and hemoglobin 16.9.  VBG with pH/pCO2 of 7.4/30. CT abdomen showed nonspecific acute enteritis with mild acute colitis.    Patient was given NS x 3 L, dose of vancomycin and Zosyn in the ER.    Assessment and Plan:    Hypovolemic shock with lactic acidosis  Secondary to severe diarrhea due to eating raw crab.  Empirically continue Zosyn and follow-up on blood and stool cultures.  Continue aggressive volume resuscitation.  Give another liter LR bolus and start half NS with 50 mEq sodium bicarbonate and 20 mg potassium chloride at 100 mm/h.    Mixed anion gap and non-anion gap metabolic acidosis  Anion gap acidosis due to lactic acidosis and mild ketosis and nongap anion gap acidosis due to GI losses and resuscitation with NS.  Add bicarbonate to IV fluid as above.    Diabetes mellitus type 2.  Initial blood glucose of 293, likely stress-induced as on recheck it came down to 148 without any hypoglycemic agents.  Prior to  admission on metformin, Farxiga and Ozempic.  Will hold his medication given his sliding scale insulin.    Essential hypertension.  Prior to admission on lisinopril which will be held.    Hyperlipidemia.  Resume simvastatin on discharge.    GERD.  Continue PPI.    Hemoconcentration due to dehydration.  Expect drop in hemoglobin with IV hydration.      DVT Prophylaxis: Pneumatic Compression Devices  Code Status: Full Code  Discharge Dispo: Likely home in 2 to 3 days.    Clinically Significant Risk Factors Present on Admission             # Anion Gap Metabolic Acidosis: Highest Anion Gap = 22 mmol/L in last 2 days, will monitor and treat as appropriate                            History of Present Illness:  Charlette Rodgers is a 69 year old female with past medical history of essential hypertension, hyperlipidemia and diabetes.    She ate some raw crab yesterday and has been having loose watery stools since then.  She estimates more than 20 stools today.  She has nausea with minimal emesis.  She also complains of mild diffuse abdominal pain but no fever rigors or chills.  She feels very dry and mildly dizzy.  Otherwise alert awake and oriented x 3.    Vitals on presentation: Temperature 98.2  F, heart rate 136/min, blood pressure of 96/66 which dropped down to 78/58, respiratory rate of 22 and oxygen saturation of 99% on room air.    Initial lactate was 5.6.  CMP showed BUN/creatinine of 29.9/0.9 with glucose of 293.  Ketones of 0.6.  WBC 5.8 and hemoglobin 16.9.  VBG with pH/pCO2 of 7.4/30. CT abdomen showed nonspecific acute enteritis with mild acute colitis.    Patient was given NS x 3 L, dose of vancomycin and Zosyn in the ER.       Past Medical History:  No past medical history on file.  Past Surgical History:  No past surgical history on file.  Social History:  Social History     Tobacco Use    Smoking status: Not on file    Smokeless tobacco: Not on file   Substance Use Topics    Alcohol use: Not on file      Social History     Social History Narrative    Not on file     Family History:  No family history on file.  Allergies:  Allergies   Allergen Reactions    Dulaglutide Rash     LOCAL REACTION TO INJECT SITE    Penicillins Rash     Medications:  (Not in a hospital admission)    Review of Systems:  A Comprehensive greater than 10 system review of systems was carried out.  Pertinent positives and negatives are noted above.  Otherwise negative for contributory information.     Physical Exam:  Blood pressure 93/54, pulse 102, temperature 98.2  F (36.8  C), temperature source Oral, resp. rate 26, weight 63.5 kg (140 lb), SpO2 94%.  Wt Readings from Last 1 Encounters:   02/11/24 63.5 kg (140 lb)       Exam:  General: Alert, awake, no acute distress.  HEENT: Dry mucosa.  Cardiac: RRR, S1, S2 tachycardic.  No murmurs appreciated.  Pulmonary: Normal chest rise, normal work of breathing.  Lungs CTA BL  Abdomen: soft, mild diffuse tenderness.  Extremities: no deformities.  Warm, well perfused.  Skin: no rashes or lesions noted.  Warm and Dry.  Neuro: No focal deficits noted.  Speech clear.  Coordination and strength grossly normal.  Psych: Appropriate affect.    I have personally reviewed the following data including lab tests and imaging:  EKG: Sinus tachycardia  Imaging:  Results for orders placed or performed during the hospital encounter of 02/11/24   CT Abdomen Pelvis w Contrast    Narrative    EXAM: CT ABDOMEN PELVIS W CONTRAST  LOCATION: [facility]  DATE: 2/11/2024    INDICATION: Abdominal pain, nausea, vomiting, diarrhea and fevers.  COMPARISON: None.  TECHNIQUE: CT scan of the abdomen and pelvis was performed following injection of IV contrast. Multiplanar reformats were obtained. Dose reduction techniques were used.  CONTRAST: 71mL Isovue 370    FINDINGS:   LOWER CHEST: Visualized lungs are clear. No pleural effusion. Heart size normal with no pericardial effusion.  Small esophageal hiatal  hernia.    HEPATOBILIARY: Moderate diffuse hepatic steatosis. Liver is otherwise normal. Mild bile duct dilatation with no radiodense stone or mass consistent with reservoir effect from prior cholecystectomy.    PANCREAS: Normal.    SPLEEN: Spleen size normal.    ADRENAL GLANDS: Normal.    KIDNEYS/BLADDER: Kidneys, ureters and bladder are normal.    BOWEL: Mucosal hyperenhancement and severe circumferential submucosal edema and perienteric edema involving the mid and distal jejunum and the entire ileum as well as mucosal hyperenhancement and milder submucosal edema and pericolic edema affecting the   cecum and ascending colon. Mild mural thickening and liquid stool throughout the remainder of the colon. Findings compatible with a severe nonspecific acute enteritis and milder acute colitis.    Trace amount of free fluid in the abdomen and pelvis. No obstruction, pneumatosis or free air.    LYMPH NODES: No lymphadenopathy.    VASCULATURE: Normal caliber abdominal aorta.   Patent nonstenotic mesenteric and renal arteries. IVC and hepatic, portal, mesenteric, splenic and renal veins patent.    PELVIC ORGANS: No pelvic mass.    MUSCULOSKELETAL: Unremarkable.      Impression    IMPRESSION:   1.  Severe nonspecific acute enteritis and milder acute colitis. Vasculature appears to be normal for this is most likely infectious with inflammatory bowel disease flare not excluded.  2.  Moderate diffuse hepatic steatosis.   3.  Mild bile duct dilatation with no radiodense stone or mass consistent with reservoir effect from prior cholecystectomy.     Labs:  Recent Labs   Lab 02/11/24  1755   WBC 5.8   HGB 16.9*   HCT 52.3*   MCV 95             Lab Results   Component Value Date     02/11/2024     07/11/2022    Lab Results   Component Value Date    CHLORIDE 96 02/11/2024    CHLORIDE 106 07/11/2022    Lab Results   Component Value Date    BUN 29.9 02/11/2024    BUN 13 07/11/2022      Lab Results   Component Value  Date    POTASSIUM 4.2 02/11/2024    POTASSIUM 5.0 07/11/2022    Lab Results   Component Value Date    CO2 15 02/11/2024    CO2 23 07/11/2022    Lab Results   Component Value Date    CR 0.90 02/11/2024    CR 0.39 07/11/2022          I've spent 85 minutes in chart review, ordering medications and tests, obtaining additional history from the EMR and the ER provider, evaluating the patient and in documentation for this encounter.    Gallito Chavis MD  Hospitalist  [facility]          None

## 2024-06-05 ENCOUNTER — RX RENEWAL (OUTPATIENT)
Age: 34
End: 2024-06-05

## 2024-07-09 ENCOUNTER — APPOINTMENT (OUTPATIENT)
Dept: OBGYN | Facility: CLINIC | Age: 34
End: 2024-07-09
Payer: COMMERCIAL

## 2024-07-09 VITALS
DIASTOLIC BLOOD PRESSURE: 72 MMHG | WEIGHT: 142.25 LBS | SYSTOLIC BLOOD PRESSURE: 122 MMHG | HEIGHT: 65 IN | BODY MASS INDEX: 23.7 KG/M2

## 2024-07-09 DIAGNOSIS — N91.2 AMENORRHEA, UNSPECIFIED: ICD-10-CM

## 2024-07-09 LAB
HCG UR QL: POSITIVE
QUALITY CONTROL: YES

## 2024-07-09 PROCEDURE — 99213 OFFICE O/P EST LOW 20 MIN: CPT

## 2024-07-09 PROCEDURE — 99459 PELVIC EXAMINATION: CPT

## 2024-07-09 PROCEDURE — 81025 URINE PREGNANCY TEST: CPT

## 2024-07-11 LAB
C TRACH RRNA SPEC QL NAA+PROBE: NOT DETECTED
N GONORRHOEA RRNA SPEC QL NAA+PROBE: NOT DETECTED
SOURCE AMPLIFICATION: NORMAL

## 2024-07-17 ENCOUNTER — NON-APPOINTMENT (OUTPATIENT)
Age: 34
End: 2024-07-17

## 2024-07-18 ENCOUNTER — NON-APPOINTMENT (OUTPATIENT)
Age: 34
End: 2024-07-18

## 2024-07-18 DIAGNOSIS — Z13.71 ENCOUNTER FOR NONPROCREATIVE SCREENING FOR GENETIC DISEASE CARRIER STATUS: ICD-10-CM

## 2024-07-18 DIAGNOSIS — Z34.91 ENCOUNTER FOR SUPERVISION OF NORMAL PREGNANCY, UNSPECIFIED, FIRST TRIMESTER: ICD-10-CM

## 2024-07-22 ENCOUNTER — APPOINTMENT (OUTPATIENT)
Dept: OBGYN | Facility: CLINIC | Age: 34
End: 2024-07-22
Payer: COMMERCIAL

## 2024-07-22 VITALS
WEIGHT: 142 LBS | SYSTOLIC BLOOD PRESSURE: 110 MMHG | HEIGHT: 65 IN | DIASTOLIC BLOOD PRESSURE: 76 MMHG | BODY MASS INDEX: 23.66 KG/M2

## 2024-07-22 LAB
APPEARANCE: CLEAR
BILIRUBIN URINE: NEGATIVE
BLOOD URINE: NEGATIVE
COLOR: YELLOW
GLUCOSE QUALITATIVE U: NEGATIVE
KETONES URINE: NEGATIVE
LEUKOCYTE ESTERASE URINE: ABNORMAL
NITRITE URINE: NEGATIVE
PH URINE: 7
PROTEIN URINE: NEGATIVE
SPECIFIC GRAVITY URINE: 1.02
UROBILINOGEN URINE: 0.2 (ref 0.2–?)

## 2024-07-22 PROCEDURE — 0500F INITIAL PRENATAL CARE VISIT: CPT

## 2024-07-22 PROCEDURE — 36415 COLL VENOUS BLD VENIPUNCTURE: CPT | Mod: NC

## 2024-07-22 PROCEDURE — 81003 URINALYSIS AUTO W/O SCOPE: CPT | Mod: NC,QW

## 2024-07-24 LAB
ABO + RH PNL BLD: NORMAL
ALBUMIN SERPL ELPH-MCNC: 4.9 G/DL
ALP BLD-CCNC: 41 U/L
ALT SERPL-CCNC: 13 U/L
ANION GAP SERPL CALC-SCNC: 19 MMOL/L
APPEARANCE: CLEAR
AST SERPL-CCNC: 15 U/L
BILIRUB SERPL-MCNC: 0.2 MG/DL
BILIRUBIN URINE: NEGATIVE
BLD GP AB SCN SERPL QL: NORMAL
BLOOD URINE: NEGATIVE
BUN SERPL-MCNC: 12 MG/DL
CALCIUM SERPL-MCNC: 9.7 MG/DL
CHLORIDE SERPL-SCNC: 98 MMOL/L
CMV IGG SERPL QL: <0.2 U/ML
CMV IGG SERPL-IMP: NEGATIVE
CMV IGM SERPL QL: <8 AU/ML
CMV IGM SERPL QL: NEGATIVE
CO2 SERPL-SCNC: 19 MMOL/L
COLOR: YELLOW
CREAT SERPL-MCNC: 0.63 MG/DL
EGFR: 120 ML/MIN/1.73M2
ESTIMATED AVERAGE GLUCOSE: 97 MG/DL
GLUCOSE QUALITATIVE U: NEGATIVE MG/DL
GLUCOSE SERPL-MCNC: 41 MG/DL
HBA1C MFR BLD HPLC: 5 %
HBV SURFACE AG SER QL: NONREACTIVE
HCT VFR BLD CALC: 38.7 %
HCV AB SER QL: NONREACTIVE
HCV S/CO RATIO: 0.45 S/CO
HGB A MFR BLD: 97.1 %
HGB A2 MFR BLD: 2.6 %
HGB BLD-MCNC: 12.4 G/DL
HGB F MFR BLD: 0.3 %
HGB FRACT BLD-IMP: NORMAL
HIV1+2 AB SPEC QL IA.RAPID: NONREACTIVE
KETONES URINE: NEGATIVE MG/DL
LEAD BLD-MCNC: <1 UG/DL
LEUKOCYTE ESTERASE URINE: ABNORMAL
MCHC RBC-ENTMCNC: 30.6 PG
MCHC RBC-ENTMCNC: 32 GM/DL
MCV RBC AUTO: 95.6 FL
MEV IGG FLD QL IA: 10 AU/ML
MEV IGG+IGM SER-IMP: NEGATIVE
MUV AB SER-ACNC: POSITIVE
MUV IGG SER QL IA: 165 AU/ML
NITRITE URINE: NEGATIVE
PH URINE: 7
PLATELET # BLD AUTO: 230 K/UL
POTASSIUM SERPL-SCNC: 3.3 MMOL/L
PROT SERPL-MCNC: 7.6 G/DL
PROTEIN URINE: NEGATIVE MG/DL
RBC # BLD: 4.05 M/UL
RBC # FLD: 13.2 %
RUBV IGG FLD-ACNC: 5.73 INDEX
RUBV IGG SER-IMP: POSITIVE
SODIUM SERPL-SCNC: 136 MMOL/L
SPECIFIC GRAVITY URINE: 1.01
T GONDII AB SER-IMP: NEGATIVE
T GONDII AB SER-IMP: NEGATIVE
T GONDII IGG SER QL: <3 IU/ML
T GONDII IGM SER QL: <3 AU/ML
T PALLIDUM AB SER QL IA: NEGATIVE
TSH SERPL-ACNC: 0.42 UIU/ML
UROBILINOGEN URINE: 0.2 MG/DL
VZV AB TITR SER: POSITIVE
VZV IGG SER IF-ACNC: 895.8 INDEX
WBC # FLD AUTO: 9.72 K/UL

## 2024-07-25 LAB
B19V IGG SER QL IA: 5.14 INDEX
B19V IGG+IGM SER-IMP: NORMAL
B19V IGG+IGM SER-IMP: POSITIVE
B19V IGM FLD-ACNC: 0.19 INDEX
B19V IGM SER-ACNC: NEGATIVE
M TB IFN-G BLD-IMP: NEGATIVE
QUANTIFERON TB PLUS MITOGEN MINUS NIL: >10 IU/ML
QUANTIFERON TB PLUS NIL: 0.02 IU/ML
QUANTIFERON TB PLUS TB1 MINUS NIL: 0 IU/ML
QUANTIFERON TB PLUS TB2 MINUS NIL: 0.02 IU/ML

## 2024-08-05 ENCOUNTER — ASOB RESULT (OUTPATIENT)
Age: 34
End: 2024-08-05

## 2024-08-05 ENCOUNTER — APPOINTMENT (OUTPATIENT)
Dept: ANTEPARTUM | Facility: CLINIC | Age: 34
End: 2024-08-05

## 2024-08-05 PROCEDURE — 76801 OB US < 14 WKS SINGLE FETUS: CPT

## 2024-08-06 ENCOUNTER — APPOINTMENT (OUTPATIENT)
Dept: ANTEPARTUM | Facility: CLINIC | Age: 34
End: 2024-08-06

## 2024-08-13 ENCOUNTER — APPOINTMENT (OUTPATIENT)
Dept: OBGYN | Facility: CLINIC | Age: 34
End: 2024-08-13
Payer: COMMERCIAL

## 2024-08-13 PROCEDURE — 36415 COLL VENOUS BLD VENIPUNCTURE: CPT

## 2024-08-22 ENCOUNTER — APPOINTMENT (OUTPATIENT)
Dept: ANTEPARTUM | Facility: CLINIC | Age: 34
End: 2024-08-22
Payer: COMMERCIAL

## 2024-08-22 ENCOUNTER — ASOB RESULT (OUTPATIENT)
Age: 34
End: 2024-08-22

## 2024-08-22 PROCEDURE — 76813 OB US NUCHAL MEAS 1 GEST: CPT

## 2024-08-28 ENCOUNTER — APPOINTMENT (OUTPATIENT)
Dept: OBGYN | Facility: CLINIC | Age: 34
End: 2024-08-28
Payer: COMMERCIAL

## 2024-08-28 VITALS
BODY MASS INDEX: 24.32 KG/M2 | SYSTOLIC BLOOD PRESSURE: 100 MMHG | WEIGHT: 146 LBS | HEIGHT: 65 IN | DIASTOLIC BLOOD PRESSURE: 60 MMHG

## 2024-08-28 LAB
APPEARANCE: CLEAR
BILIRUBIN URINE: NEGATIVE
BLOOD URINE: NEGATIVE
COLOR: YELLOW
GLUCOSE QUALITATIVE U: NEGATIVE
KETONES URINE: NEGATIVE
LEUKOCYTE ESTERASE URINE: ABNORMAL
NITRITE URINE: NEGATIVE
PH URINE: 7
PROTEIN URINE: NEGATIVE
SPECIFIC GRAVITY URINE: 1.01
UROBILINOGEN URINE: 0.2 (ref 0.2–?)

## 2024-08-28 PROCEDURE — 0502F SUBSEQUENT PRENATAL CARE: CPT

## 2024-08-28 PROCEDURE — 81003 URINALYSIS AUTO W/O SCOPE: CPT | Mod: NC,QW

## 2024-09-11 DIAGNOSIS — Z34.92 ENCOUNTER FOR SUPERVISION OF NORMAL PREGNANCY, UNSPECIFIED, SECOND TRIMESTER: ICD-10-CM

## 2024-09-11 DIAGNOSIS — Z34.91 ENCOUNTER FOR SUPERVISION OF NORMAL PREGNANCY, UNSPECIFIED, FIRST TRIMESTER: ICD-10-CM

## 2024-09-18 LAB
AFP ADJ MOM SERPL: 1.26
AFP CONCENTRATION: 40.15 NG/ML
AFP INTERPRETATION: NORMAL
AFP MOM CUT-OFF: 2.5
AFP PERCENTILE: 76.1
AFP SCREENING RESULT: NORMAL
AFTER SCREENING RISK OPEN SPINA BIFIDA: NORMAL
BEFORE SCREENING RISK OPEN SPINA BIFIDA: NORMAL
CARBAMAZEPINE?: NO
CURRENT SMOKER: NO
EXTREME ANALYTE ALERT: NO
FAMILY HISTORY OPEN SPINA BIFIDA: NO
GA: NORMAL
GESTATIONAL AGE METHOD: NORMAL
IDDM PATIENT QL: NO
MULTIPLE PREGNANCY: NORMAL
TEST PERFORMANCE INFO SPEC: NORMAL
VALPROIC ACID?: NO

## 2024-09-24 ENCOUNTER — APPOINTMENT (OUTPATIENT)
Dept: OBGYN | Facility: CLINIC | Age: 34
End: 2024-09-24
Payer: COMMERCIAL

## 2024-09-24 VITALS
DIASTOLIC BLOOD PRESSURE: 64 MMHG | WEIGHT: 149 LBS | BODY MASS INDEX: 24.83 KG/M2 | HEIGHT: 65 IN | SYSTOLIC BLOOD PRESSURE: 100 MMHG

## 2024-09-24 PROCEDURE — 0502F SUBSEQUENT PRENATAL CARE: CPT

## 2024-09-24 PROCEDURE — 81003 URINALYSIS AUTO W/O SCOPE: CPT | Mod: NC,QW

## 2024-10-09 ENCOUNTER — NON-APPOINTMENT (OUTPATIENT)
Age: 34
End: 2024-10-09

## 2024-10-16 ENCOUNTER — ASOB RESULT (OUTPATIENT)
Age: 34
End: 2024-10-16

## 2024-10-16 ENCOUNTER — APPOINTMENT (OUTPATIENT)
Dept: ANTEPARTUM | Facility: CLINIC | Age: 34
End: 2024-10-16
Payer: COMMERCIAL

## 2024-10-16 PROCEDURE — 76817 TRANSVAGINAL US OBSTETRIC: CPT

## 2024-10-16 PROCEDURE — 76811 OB US DETAILED SNGL FETUS: CPT

## 2024-10-23 ENCOUNTER — APPOINTMENT (OUTPATIENT)
Dept: OBGYN | Facility: CLINIC | Age: 34
End: 2024-10-23
Payer: COMMERCIAL

## 2024-10-23 ENCOUNTER — NON-APPOINTMENT (OUTPATIENT)
Age: 34
End: 2024-10-23

## 2024-10-23 VITALS
SYSTOLIC BLOOD PRESSURE: 100 MMHG | WEIGHT: 151 LBS | BODY MASS INDEX: 25.16 KG/M2 | DIASTOLIC BLOOD PRESSURE: 60 MMHG | HEIGHT: 65 IN

## 2024-10-23 LAB
APPEARANCE: CLEAR
BILIRUBIN URINE: NEGATIVE
BLOOD URINE: NEGATIVE
COLOR: YELLOW
GLUCOSE QUALITATIVE U: NEGATIVE
KETONES URINE: 15
LEUKOCYTE ESTERASE URINE: ABNORMAL
NITRITE URINE: NEGATIVE
PH URINE: 7
PROTEIN URINE: NEGATIVE
SPECIFIC GRAVITY URINE: 1.01
UROBILINOGEN URINE: 0.2 (ref 0.2–?)

## 2024-10-23 PROCEDURE — 0502F SUBSEQUENT PRENATAL CARE: CPT

## 2024-10-23 PROCEDURE — 81003 URINALYSIS AUTO W/O SCOPE: CPT | Mod: NC,QW

## 2024-11-11 DIAGNOSIS — Z34.92 ENCOUNTER FOR SUPERVISION OF NORMAL PREGNANCY, UNSPECIFIED, SECOND TRIMESTER: ICD-10-CM

## 2024-11-19 ENCOUNTER — APPOINTMENT (OUTPATIENT)
Dept: OBGYN | Facility: CLINIC | Age: 34
End: 2024-11-19
Payer: COMMERCIAL

## 2024-11-19 VITALS
HEIGHT: 65 IN | WEIGHT: 153 LBS | DIASTOLIC BLOOD PRESSURE: 70 MMHG | BODY MASS INDEX: 25.49 KG/M2 | SYSTOLIC BLOOD PRESSURE: 104 MMHG

## 2024-11-19 LAB
APPEARANCE: CLEAR
BILIRUBIN URINE: NEGATIVE
BLOOD URINE: NEGATIVE
COLOR: YELLOW
GLUCOSE QUALITATIVE U: NEGATIVE
KETONES URINE: NEGATIVE
LEUKOCYTE ESTERASE URINE: NEGATIVE
NITRITE URINE: NEGATIVE
PH URINE: 6.5
PROTEIN URINE: NEGATIVE
SPECIFIC GRAVITY URINE: 1.01
UROBILINOGEN URINE: 0.2 (ref 0.2–?)

## 2024-11-19 PROCEDURE — 81003 URINALYSIS AUTO W/O SCOPE: CPT | Mod: NC,QW

## 2024-11-19 PROCEDURE — 0502F SUBSEQUENT PRENATAL CARE: CPT

## 2024-11-27 ENCOUNTER — LABORATORY RESULT (OUTPATIENT)
Age: 34
End: 2024-11-27

## 2024-12-02 DIAGNOSIS — R73.09 OTHER ABNORMAL GLUCOSE: ICD-10-CM

## 2024-12-07 RX ORDER — NITROFURANTOIN (MONOHYDRATE/MACROCRYSTALS) 25; 75 MG/1; MG/1
100 CAPSULE ORAL
Qty: 14 | Refills: 0 | Status: ACTIVE | COMMUNITY
Start: 2024-12-07 | End: 1900-01-01

## 2024-12-09 ENCOUNTER — APPOINTMENT (OUTPATIENT)
Dept: ANTEPARTUM | Facility: CLINIC | Age: 34
End: 2024-12-09
Payer: COMMERCIAL

## 2024-12-09 ENCOUNTER — ASOB RESULT (OUTPATIENT)
Age: 34
End: 2024-12-09

## 2024-12-09 PROCEDURE — 76816 OB US FOLLOW-UP PER FETUS: CPT

## 2024-12-10 ENCOUNTER — APPOINTMENT (OUTPATIENT)
Dept: OBGYN | Facility: CLINIC | Age: 34
End: 2024-12-10
Payer: COMMERCIAL

## 2024-12-10 VITALS
WEIGHT: 158 LBS | DIASTOLIC BLOOD PRESSURE: 60 MMHG | BODY MASS INDEX: 26.33 KG/M2 | SYSTOLIC BLOOD PRESSURE: 100 MMHG | HEIGHT: 65 IN

## 2024-12-10 LAB
APPEARANCE: CLEAR
BILIRUBIN URINE: NEGATIVE
BLOOD URINE: NEGATIVE
COLOR: YELLOW
GLUCOSE QUALITATIVE U: NEGATIVE
KETONES URINE: >=160
LEUKOCYTE ESTERASE URINE: ABNORMAL
NITRITE URINE: NEGATIVE
PH URINE: 6
PROTEIN URINE: NEGATIVE
SPECIFIC GRAVITY URINE: 1.02
UROBILINOGEN URINE: 0.2 (ref 0.2–?)

## 2024-12-10 PROCEDURE — 0502F SUBSEQUENT PRENATAL CARE: CPT

## 2024-12-10 PROCEDURE — 81003 URINALYSIS AUTO W/O SCOPE: CPT | Mod: NC,QW

## 2025-01-06 ENCOUNTER — APPOINTMENT (OUTPATIENT)
Dept: ANTEPARTUM | Facility: CLINIC | Age: 35
End: 2025-01-06
Payer: COMMERCIAL

## 2025-01-06 ENCOUNTER — ASOB RESULT (OUTPATIENT)
Age: 35
End: 2025-01-06

## 2025-01-06 PROCEDURE — 76816 OB US FOLLOW-UP PER FETUS: CPT

## 2025-01-07 ENCOUNTER — NON-APPOINTMENT (OUTPATIENT)
Age: 35
End: 2025-01-07

## 2025-01-07 ENCOUNTER — APPOINTMENT (OUTPATIENT)
Dept: OBGYN | Facility: CLINIC | Age: 35
End: 2025-01-07
Payer: COMMERCIAL

## 2025-01-07 VITALS
BODY MASS INDEX: 26.66 KG/M2 | SYSTOLIC BLOOD PRESSURE: 108 MMHG | DIASTOLIC BLOOD PRESSURE: 68 MMHG | HEIGHT: 65 IN | WEIGHT: 160 LBS

## 2025-01-07 DIAGNOSIS — Z23 ENCOUNTER FOR IMMUNIZATION: ICD-10-CM

## 2025-01-07 PROCEDURE — 0502F SUBSEQUENT PRENATAL CARE: CPT

## 2025-01-07 PROCEDURE — 90471 IMMUNIZATION ADMIN: CPT

## 2025-01-07 PROCEDURE — 90715 TDAP VACCINE 7 YRS/> IM: CPT

## 2025-01-07 PROCEDURE — 81003 URINALYSIS AUTO W/O SCOPE: CPT | Mod: NC,QW

## 2025-01-21 ENCOUNTER — APPOINTMENT (OUTPATIENT)
Dept: OBGYN | Facility: CLINIC | Age: 35
End: 2025-01-21
Payer: COMMERCIAL

## 2025-01-21 VITALS
DIASTOLIC BLOOD PRESSURE: 70 MMHG | HEIGHT: 65 IN | BODY MASS INDEX: 27.16 KG/M2 | SYSTOLIC BLOOD PRESSURE: 118 MMHG | WEIGHT: 163 LBS

## 2025-01-21 DIAGNOSIS — B37.9 CANDIDIASIS, UNSPECIFIED: ICD-10-CM

## 2025-01-21 LAB
APPEARANCE: CLEAR
BILIRUBIN URINE: NEGATIVE
BLOOD URINE: NEGATIVE
COLOR: YELLOW
GLUCOSE QUALITATIVE U: NEGATIVE
KETONES URINE: NEGATIVE
LEUKOCYTE ESTERASE URINE: ABNORMAL
NITRITE URINE: NEGATIVE
PH URINE: 6
PROTEIN URINE: NEGATIVE
SPECIFIC GRAVITY URINE: 1.02
UROBILINOGEN URINE: 0.2 (ref 0.2–?)

## 2025-01-21 PROCEDURE — 81003 URINALYSIS AUTO W/O SCOPE: CPT | Mod: QW

## 2025-01-21 PROCEDURE — 0502F SUBSEQUENT PRENATAL CARE: CPT

## 2025-01-22 PROBLEM — B37.9 YEAST INFECTION: Status: ACTIVE | Noted: 2025-01-22

## 2025-01-22 RX ORDER — TERCONAZOLE 4 MG/G
0.4 CREAM VAGINAL DAILY
Qty: 1 | Refills: 0 | Status: ACTIVE | COMMUNITY
Start: 2025-01-22 | End: 1900-01-01

## 2025-01-27 ENCOUNTER — ASOB RESULT (OUTPATIENT)
Age: 35
End: 2025-01-27

## 2025-01-27 ENCOUNTER — APPOINTMENT (OUTPATIENT)
Dept: ANTEPARTUM | Facility: CLINIC | Age: 35
End: 2025-01-27
Payer: COMMERCIAL

## 2025-01-27 PROCEDURE — 76819 FETAL BIOPHYS PROFIL W/O NST: CPT

## 2025-01-27 PROCEDURE — 93976 VASCULAR STUDY: CPT

## 2025-01-27 PROCEDURE — 76816 OB US FOLLOW-UP PER FETUS: CPT

## 2025-01-27 PROCEDURE — 76821 MIDDLE CEREBRAL ARTERY ECHO: CPT | Mod: 59

## 2025-01-27 PROCEDURE — 76820 UMBILICAL ARTERY ECHO: CPT | Mod: 59

## 2025-01-28 ENCOUNTER — APPOINTMENT (OUTPATIENT)
Dept: OBGYN | Facility: CLINIC | Age: 35
End: 2025-01-28
Payer: COMMERCIAL

## 2025-01-28 ENCOUNTER — NON-APPOINTMENT (OUTPATIENT)
Age: 35
End: 2025-01-28

## 2025-01-28 VITALS
HEIGHT: 65 IN | BODY MASS INDEX: 27.16 KG/M2 | WEIGHT: 163 LBS | DIASTOLIC BLOOD PRESSURE: 78 MMHG | SYSTOLIC BLOOD PRESSURE: 120 MMHG

## 2025-01-28 DIAGNOSIS — O36.5990 MATERNAL CARE FOR OTHER KNOWN OR SUSPECTED POOR FETAL GROWTH, UNSPECIFIED TRIMESTER, NOT APPLICABLE OR UNSPECIFIED: ICD-10-CM

## 2025-01-28 LAB
APPEARANCE: CLEAR
BILIRUBIN URINE: NEGATIVE
BLOOD URINE: NEGATIVE
COLOR: YELLOW
GLUCOSE QUALITATIVE U: NEGATIVE
KETONES URINE: NEGATIVE
LEUKOCYTE ESTERASE URINE: NEGATIVE
NITRITE URINE: NEGATIVE
PH URINE: 7
PROTEIN URINE: NEGATIVE
SPECIFIC GRAVITY URINE: 1.01
UROBILINOGEN URINE: 0.2 (ref 0.2–?)

## 2025-01-28 PROCEDURE — 0502F SUBSEQUENT PRENATAL CARE: CPT

## 2025-01-28 PROCEDURE — 81003 URINALYSIS AUTO W/O SCOPE: CPT | Mod: QW

## 2025-02-03 ENCOUNTER — APPOINTMENT (OUTPATIENT)
Dept: ANTEPARTUM | Facility: CLINIC | Age: 35
End: 2025-02-03
Payer: COMMERCIAL

## 2025-02-03 ENCOUNTER — ASOB RESULT (OUTPATIENT)
Age: 35
End: 2025-02-03

## 2025-02-03 ENCOUNTER — APPOINTMENT (OUTPATIENT)
Dept: OBGYN | Facility: CLINIC | Age: 35
End: 2025-02-03
Payer: COMMERCIAL

## 2025-02-03 VITALS
WEIGHT: 166.31 LBS | DIASTOLIC BLOOD PRESSURE: 80 MMHG | SYSTOLIC BLOOD PRESSURE: 122 MMHG | BODY MASS INDEX: 27.71 KG/M2 | HEIGHT: 65 IN

## 2025-02-03 DIAGNOSIS — Z34.93 ENCOUNTER FOR SUPERVISION OF NORMAL PREGNANCY, UNSPECIFIED, THIRD TRIMESTER: ICD-10-CM

## 2025-02-03 DIAGNOSIS — Z34.92 ENCOUNTER FOR SUPERVISION OF NORMAL PREGNANCY, UNSPECIFIED, SECOND TRIMESTER: ICD-10-CM

## 2025-02-03 PROCEDURE — 76820 UMBILICAL ARTERY ECHO: CPT

## 2025-02-03 PROCEDURE — 81003 URINALYSIS AUTO W/O SCOPE: CPT | Mod: QW

## 2025-02-03 PROCEDURE — 0502F SUBSEQUENT PRENATAL CARE: CPT

## 2025-02-03 PROCEDURE — 76818 FETAL BIOPHYS PROFILE W/NST: CPT

## 2025-02-03 PROCEDURE — 76821 MIDDLE CEREBRAL ARTERY ECHO: CPT

## 2025-02-03 PROCEDURE — 36415 COLL VENOUS BLD VENIPUNCTURE: CPT

## 2025-02-04 LAB
HCT VFR BLD CALC: 35 %
HCV AB SER QL: NONREACTIVE
HCV S/CO RATIO: 0.42 S/CO
HGB BLD-MCNC: 11.4 G/DL
HIV1+2 AB SPEC QL IA.RAPID: NONREACTIVE
MCHC RBC-ENTMCNC: 29.3 PG
MCHC RBC-ENTMCNC: 32.6 G/DL
MCV RBC AUTO: 90 FL
PLATELET # BLD AUTO: 149 K/UL
RBC # BLD: 3.89 M/UL
RBC # FLD: 13.4 %
T PALLIDUM AB SER QL IA: NEGATIVE
WBC # FLD AUTO: 9.67 K/UL

## 2025-02-06 ENCOUNTER — ASOB RESULT (OUTPATIENT)
Age: 35
End: 2025-02-06

## 2025-02-06 ENCOUNTER — APPOINTMENT (OUTPATIENT)
Dept: ANTEPARTUM | Facility: CLINIC | Age: 35
End: 2025-02-06
Payer: COMMERCIAL

## 2025-02-06 LAB
GP B STREP DNA SPEC QL NAA+PROBE: NOT DETECTED
SOURCE GBS: NORMAL

## 2025-02-06 PROCEDURE — 76818 FETAL BIOPHYS PROFILE W/NST: CPT

## 2025-02-06 PROCEDURE — 76821 MIDDLE CEREBRAL ARTERY ECHO: CPT

## 2025-02-06 PROCEDURE — 76820 UMBILICAL ARTERY ECHO: CPT

## 2025-02-06 PROCEDURE — 93976 VASCULAR STUDY: CPT

## 2025-02-10 ENCOUNTER — NON-APPOINTMENT (OUTPATIENT)
Age: 35
End: 2025-02-10

## 2025-02-10 ENCOUNTER — APPOINTMENT (OUTPATIENT)
Dept: OBGYN | Facility: CLINIC | Age: 35
End: 2025-02-10
Payer: COMMERCIAL

## 2025-02-10 ENCOUNTER — APPOINTMENT (OUTPATIENT)
Dept: ANTEPARTUM | Facility: CLINIC | Age: 35
End: 2025-02-10
Payer: COMMERCIAL

## 2025-02-10 ENCOUNTER — ASOB RESULT (OUTPATIENT)
Age: 35
End: 2025-02-10

## 2025-02-10 ENCOUNTER — APPOINTMENT (OUTPATIENT)
Dept: ANTEPARTUM | Facility: CLINIC | Age: 35
End: 2025-02-10

## 2025-02-10 VITALS
BODY MASS INDEX: 27.75 KG/M2 | WEIGHT: 166.56 LBS | DIASTOLIC BLOOD PRESSURE: 74 MMHG | HEIGHT: 65 IN | SYSTOLIC BLOOD PRESSURE: 110 MMHG

## 2025-02-10 LAB
APPEARANCE: CLEAR
BILIRUBIN URINE: NEGATIVE
BLOOD URINE: ABNORMAL
COLOR: YELLOW
GLUCOSE QUALITATIVE U: NEGATIVE
KETONES URINE: NEGATIVE
LEUKOCYTE ESTERASE URINE: ABNORMAL
NITRITE URINE: NEGATIVE
PH URINE: 7
PROTEIN URINE: NEGATIVE
SPECIFIC GRAVITY URINE: 1.02
UROBILINOGEN URINE: 0.2 (ref 0.2–?)

## 2025-02-10 PROCEDURE — 0502F SUBSEQUENT PRENATAL CARE: CPT

## 2025-02-10 PROCEDURE — 76820 UMBILICAL ARTERY ECHO: CPT

## 2025-02-10 PROCEDURE — 76821 MIDDLE CEREBRAL ARTERY ECHO: CPT

## 2025-02-10 PROCEDURE — 76818 FETAL BIOPHYS PROFILE W/NST: CPT

## 2025-02-10 PROCEDURE — 81003 URINALYSIS AUTO W/O SCOPE: CPT | Mod: QW

## 2025-02-10 PROCEDURE — 93976 VASCULAR STUDY: CPT

## 2025-02-11 ENCOUNTER — NON-APPOINTMENT (OUTPATIENT)
Age: 35
End: 2025-02-11

## 2025-02-13 ENCOUNTER — ASOB RESULT (OUTPATIENT)
Age: 35
End: 2025-02-13

## 2025-02-13 ENCOUNTER — APPOINTMENT (OUTPATIENT)
Dept: ANTEPARTUM | Facility: CLINIC | Age: 35
End: 2025-02-13
Payer: COMMERCIAL

## 2025-02-13 PROCEDURE — 76820 UMBILICAL ARTERY ECHO: CPT

## 2025-02-13 PROCEDURE — 76821 MIDDLE CEREBRAL ARTERY ECHO: CPT

## 2025-02-13 PROCEDURE — 76818 FETAL BIOPHYS PROFILE W/NST: CPT

## 2025-02-17 ENCOUNTER — APPOINTMENT (OUTPATIENT)
Dept: OBGYN | Facility: CLINIC | Age: 35
End: 2025-02-17
Payer: COMMERCIAL

## 2025-02-17 ENCOUNTER — ASOB RESULT (OUTPATIENT)
Age: 35
End: 2025-02-17

## 2025-02-17 ENCOUNTER — APPOINTMENT (OUTPATIENT)
Dept: ANTEPARTUM | Facility: CLINIC | Age: 35
End: 2025-02-17
Payer: COMMERCIAL

## 2025-02-17 VITALS
SYSTOLIC BLOOD PRESSURE: 116 MMHG | DIASTOLIC BLOOD PRESSURE: 70 MMHG | WEIGHT: 169 LBS | HEIGHT: 65 IN | BODY MASS INDEX: 28.16 KG/M2

## 2025-02-17 PROCEDURE — 93976 VASCULAR STUDY: CPT

## 2025-02-17 PROCEDURE — 81003 URINALYSIS AUTO W/O SCOPE: CPT | Mod: QW

## 2025-02-17 PROCEDURE — 76816 OB US FOLLOW-UP PER FETUS: CPT

## 2025-02-17 PROCEDURE — 76818 FETAL BIOPHYS PROFILE W/NST: CPT

## 2025-02-17 PROCEDURE — 76821 MIDDLE CEREBRAL ARTERY ECHO: CPT | Mod: 59

## 2025-02-17 PROCEDURE — 76820 UMBILICAL ARTERY ECHO: CPT | Mod: 59

## 2025-02-17 PROCEDURE — 0502F SUBSEQUENT PRENATAL CARE: CPT

## 2025-02-20 ENCOUNTER — APPOINTMENT (OUTPATIENT)
Dept: ANTEPARTUM | Facility: CLINIC | Age: 35
End: 2025-02-20
Payer: COMMERCIAL

## 2025-02-20 ENCOUNTER — ASOB RESULT (OUTPATIENT)
Age: 35
End: 2025-02-20

## 2025-02-20 PROCEDURE — 76820 UMBILICAL ARTERY ECHO: CPT

## 2025-02-20 PROCEDURE — 76821 MIDDLE CEREBRAL ARTERY ECHO: CPT

## 2025-02-20 PROCEDURE — 76818 FETAL BIOPHYS PROFILE W/NST: CPT

## 2025-02-21 RX ORDER — OXYTOCIN-SODIUM CHLORIDE 0.9% IV SOLN 30 UNIT/500ML 30-0.9/5 UT/ML-%
167 SOLUTION INTRAVENOUS
Qty: 30 | Refills: 0 | Status: COMPLETED | OUTPATIENT
Start: 2025-02-23 | End: 2025-02-23

## 2025-02-21 RX ORDER — CITRIC ACID/SODIUM CITRATE 300-500 MG
30 SOLUTION, ORAL ORAL ONCE
Refills: 0 | Status: DISCONTINUED | OUTPATIENT
Start: 2025-02-23 | End: 2025-02-23

## 2025-02-21 RX ORDER — SODIUM CHLORIDE 9 G/1000ML
1000 INJECTION, SOLUTION INTRAVENOUS
Refills: 0 | Status: DISCONTINUED | OUTPATIENT
Start: 2025-02-23 | End: 2025-02-23

## 2025-02-22 ENCOUNTER — APPOINTMENT (OUTPATIENT)
Dept: OBGYN | Facility: HOSPITAL | Age: 35
End: 2025-02-22

## 2025-02-22 ENCOUNTER — INPATIENT (INPATIENT)
Facility: HOSPITAL | Age: 35
LOS: 1 days | Discharge: HOME | DRG: 833 | End: 2025-02-24
Attending: OBSTETRICS & GYNECOLOGY | Admitting: OBSTETRICS & GYNECOLOGY
Payer: COMMERCIAL

## 2025-02-22 VITALS
HEART RATE: 68 BPM | HEIGHT: 65 IN | TEMPERATURE: 98 F | DIASTOLIC BLOOD PRESSURE: 69 MMHG | SYSTOLIC BLOOD PRESSURE: 125 MMHG | WEIGHT: 166.01 LBS | RESPIRATION RATE: 16 BRPM

## 2025-02-22 DIAGNOSIS — O36.5930 MATERNAL CARE FOR OTHER KNOWN OR SUSPECTED POOR FETAL GROWTH, THIRD TRIMESTER, NOT APPLICABLE OR UNSPECIFIED: ICD-10-CM

## 2025-02-22 NOTE — OB RN PATIENT PROFILE - NS_PRENATALLABSOURCEHEPATITISC_OBGYN_ALL_OB
[FreeTextEntry1] : 61 YO FEMALE PRESENTS FOR ANNUAL GYN EXAMINATION. SELF BREAST EXAMINATION TAUGHT. MAMMOGRAM ORDERED. EXERCISE, CALCIUM AND VITAMIN D3 SUPPLEMENTATION DISCUSSED. BONE DENSITY STUDY AND INDICATIONS REVIEWED. COLONOSCOPY HISTORY REVIEWED AND DISCUSSED FOLLOWUP.
hard copy, drawn during this pregnancy

## 2025-02-22 NOTE — OB RN PATIENT PROFILE - NSSDOHTRANSPORT_OBGYN_A_OB
DISCHARGE SUMMARY  Patient ID:  Axel Pino  [id number]  52 year old  1964    Admit date: 10/22/2017    Discharge date and time: 11/2/2017    Admitting Physician: Marcellus Archer MD     Discharge Physician: Kyle Edmonds MD    Admission Diagnoses: PLEURAL EFFUSION  PLEURAL EFFUSION/EMPYEMA    Discharge Diagnoses:     1. Community-acquired pneumonia   2. Left lung empyema status post VATS decortication   3. Type 2 diabetes mellitus   4. Hypertension   5. Coronary artery disease  6. Chronic kidney disease stage III  7. Acute expected blood loss anemia     Discharge condition: stable    Hospital Course:     52-year-old male with past medical history of diabetes mellitus, coronary artery disease, gout who was initially admitted with pneumonia at [facility]. He was started antibiotics for community-acquired pneumonia. He was found to have a large left loculated pleural effusion. Because of this, cardiothoracic surgery was consulted and patient was transferred to [facility] for cardiothoracic surgery evaluation. Patient eventually underwent left VATS total lung decortication. Infectious disease was consulted. Patient was initially placed on Flagyl, cefepime and azithromycin. Fluid and tissue cultures later came back with Streptococcus intermedius. Antibiotic changed to Rocephin and oral Flagyl. Chest tube later removed. Patient remained stable postoperatively. Infectious disease recommended continuing antibiotics for a total of 4-6 weeks.  Patient was eventually discharged home with home services and home IV antibiotics.    Significant Diagnostic Studies:   Ct Chest    Result Date: 10/24/2017  CT CHEST WO CONTRAST CLINICAL HISTORY: Pleural effusion status post chest tube placement. TECHNIQUE: Multiple axial images were obtained through the chest without IV contrast. Coronal and sagittal reformatted images were generated from thin-section reconstructions. MIPS reconstructions were  generated and reviewed. COMPARISON: October 21, 2017. FINDINGS: Exam is limited without intravenous contrast. Multiple nonenlarged mediastinal lymph nodes. No suspicious axillary adenopathy. Mild bilateral gynecomastia. The heart is not enlarged. Trace pericardial effusion is suspected. Thoracic aorta is normal in caliber. Coronary artery disease. Interval placement of a left-sided pigtail catheter which terminates at the level of the midlung. A small pleural effusion persists with dispersed locules of gas. Small amount of subcutaneous edema is also present along the posterior chest wall. Findings are likely postprocedural in nature. Adjacent consolidative changes are again noted similar to minimally improved. Small amount of fluid is present within the left fissure. Central airways are patent. Right lung is grossly clear. Visualized upper abdomen demonstrates no acute findings. No acute osseous findings.      IMPRESSION: Interval placement of a left-sided pigtail chest tube with improved left-sided pleural effusion. A small pleural effusion does persist with dispersed locules of gas. Adjacent subcutaneous emphysema along the posterior and lateral chest wall is present. Findings are likely postprocedural in nature. Evaluation for empyema is limited without intravenous contrast. Adjacent consolidative changes are again noted, similar to minimally improved.     Ct Chest W/o Contrast    Result Date: 10/21/2017  EXAM:  CT CHEST WO CONTRAST REASON FOR STUDY:  CHEST WALL PAIN ADDITIONAL HISTORY:  None COMPARISON: CT abdomen October 21, 2017 Helical imaging of the chest were obtained, without IV contrast. Multiplanar workstation analysis was performed.    Lack of contrast can be limiting for evaluation of lung masses, inflammation, evaluation of vascular structures in the mediastinum, and detection of lymph nodes. Lungs:  There is atelectasis of the left lower lobe lung and left upper lobe lung adjacent to a rounded,  loculated appearing pleural fluid collection which measures 14 x 6 x 15 cm. This fluid collection has relatively low Hounsfield measurements, average 25 Hounsfield units, consistent with complex fluid. This could represent loculated pleural effusion or empyema. The right lung is clear. Pleura:  No right-sided abnormalities. There is no significant lymphadenopathy. The thoracic aorta is normal in caliber. There is no pericardial effusion. The  heart  is normal sized.     IMPRESSION: Large loculated left pleural effusion, causing compressive atelectasis of most of the left lower lobe and significant portion of the left upper lobe. The findings could represent simple loculation of chronic pleural effusion, or could also represent empyema. No underlying lung parenchymal abnormality is seen.     Ct Abdomen Pelvis W/ Iv Contrast Only    Result Date: 10/21/2017  EXAM: CT Abdomen and pelvis with contrast DATE: 10/21/17 COMPARISON: MRI 04/08/05 CLINICAL HISTORY: Abdominal pain TECHNIQUE: Axial images with 100 mL Isovue 300 intravenous contrast. Oral contrast was also administered. FINDINGS: ABDOMEN: A moderate, loculated left pleural effusion is identified. There is partial consolidation of the visualized left lower lobe. The right lung is clear. The heart size is normal. The liver, spleen, kidneys, pancreas, gallbladder and adrenal glands are normal. A fat-containing supraumbilical hernia is noted which measures 2 cm in diameter. PELVIS: The appendix is normal. No bowel distention is present. The bladder is normal. There is no free fluid or lymphadenopathy. Calcification of seminal vesicles is noted. Vascular calcifications are identified. The bony structures are normal.     IMPRESSION: 1. Loculated moderate left pleural effusion. 2. Consolidation left lower lobe which could be secondary to pneumonia versus compressive atelectasis. 3. Fat-containing supraumbilical ventral hernia.     Xr Chest Ap Or Pa    Result Date:  11/1/2017  EXAM: XR CHEST AP OR PA DATE/TIME: 11/1/2017 5:22 AM. INDICATION: chest tube placement. COMPARISON: Chest radiographs 10/31/2017 and 10/30/2017. FINDINGS: A left central venous catheter is in place with tip positioned at the confluence the left brachiocephalic vein and the superior vena cava. Left basilar chest tube in unchanged position. Unchanged small-to-moderate left pleural effusion. Adjacent opacities may be atelectatic or pneumonic. The cardiomediastinal silhouette is stable. The osseous structures are intact.     IMPRESSION: No significant interval change.     Xr Chest Ap Or Pa    Result Date: 10/31/2017  AP PORTABLE UPRIGHT CHEST 0525 HOURS CLINICAL INDICATION: chest tube placement  COMPARISON: 10/30/2017, 10/29/2017 FINDINGS: Cardiomediastinal mediastinal silhouette is stable and within normal limits.  No overt pulmonary vascular congestion is present.  Chest tube demonstrated projecting with tip in the left apical region has been removed since prior exam.  Left basilar chest tube remains.  No pneumothorax. Persistent hazy and strandy opacities demonstrated at left lung base with the fluid or pleural thickening/pleural reactive change tracking along the left lateral hemithorax.  Right lung is grossly clear.  Left-sided subclavian venous catheter again demonstrated with tip projecting over superior vena cava.     IMPRESSION: Aside from interval removal of one of the left-sided chest tubes the appearance the chest is similar to yesterday's exam.     Xr Chest Ap Or Pa    Result Date: 10/30/2017  Exam: XR CHEST AP OR PA Indication: chest tube placement Comparison: October 29, 2017 Findings: Blunting of the left costophrenic angle and adjacent pulmonary consolidation. Low lung volumes. No pleural effusion on the right and no pneumothorax identified bilaterally. Cardiomediastinal silhouette, left-sided chest tubes, left subclavian line and visualized bony thorax is stable.     Impression: Stable left  pleural effusion and adjacent limited consolidation.     Xr Chest Ap Or Pa    Result Date: 10/29/2017  XR CHEST AP OR PA HISTORY:  Left chest tube. COMPARISON:  October 28, 2017. TECHNIQUE:  Single, AP portable view of the chest. FINDINGS: Stable left chest tubes with tips overlying the apex and base. Presumed left PICC tip overlies the upper SVC, unchanged. Stable mild cardiomegaly. Mildly low lung volumes. The pulmonary vasculature is normal. Stable retrocardiac opacities and moderate left effusion which tracks along the lateral wall. No sizable right effusion. No appreciable pneumothorax.     IMPRESSION:  1.  Stable left chest tubes without convincing evidence for pneumothorax. 2.  Unchanged moderate left effusion and retrocardiac/basilar atelectasis or consolidation. I have reviewed the images and agree with the Resident interpretation.     Xr Chest Ap Or Pa    Result Date: 10/28/2017  Exam: XR CHEST AP OR PA Indication: chest tube placement Comparison: October 27, 2017 Findings: Monitoring leads overlying the chest. Stable left subclavian line and left-sided chest tube. Mild blunting of the left costophrenic angle and adjacent ulna consolidation. No pleural effusion on the right and no definitive pneumothorax identified bilaterally. Cardiomediastinal silhouette and visualized bony thorax is stable.     Impression: Stable left pleural effusion and adjacent pulmonary consolidation.     Xr Chest Ap Or Pa    Result Date: 10/27/2017  XR CHEST AP OR PA 10/27/2017 7:32 PM HISTORY: Postop evaluation COMPARISON:  Chest radiograph from today at 18:09 . TECHNIQUE:  Single, AP upright view of the chest. FINDINGS: Interval removal of the endotracheal tube. Unchanged position of 2 left-sided chest tubes with tip terminating at the apex and at the base of the left lung. Unchanged left subclavian line with tip in the region of the superior vena cava. The cardiac mediastinal contour and pulmonary vasculature are mildly  prominent but unchanged from prior. The retrocardiac patchy opacities are unchanged. Trace pneumothorax along the left lateral hemithorax. There is no evidence for any sizable pleural effusion. Trace subcutaneous emphysema of the left lateral chest wall, stable from prior.     IMPRESSION:  1.  Interval removal of endotracheal tube. Otherwise stable support apparatus. 2.  Trace pneumothorax of the left lateral chest. Not significantly changed since prior. 3.  No sizable pleural effusion. I have reviewed the images and agree with the Resident interpretation.     Xr Chest Ap Or Pa    Result Date: 10/27/2017  EXAM: XR CHEST AP OR PA DATE: 10/27/2017 6:16 PM HISTORY: closing COMPARISON: 10/27/2017, 0531 hours FINDINGS: Heart size upper normal. Vasculature is mildly congested.  The lung volumes are normal.  There are 2 left-sided chest tubes 1 near the left apex and one at the left lung base. Small pigtail catheter is been removed. There is a left subclavian line tip in the region of the superior vena cava or potentially azygos arch based on positioning of the catheter tip. There is an endotracheal tube with dual-lumen and left mainstem intubation. There is no pneumothorax.   The regional skeleton is unremarkable.  There is persistent patchy opacity with some pleural thickening involving the left hemithorax. Minimal subcutaneous emphysema along the left lateral hemithorax.     IMPRESSION: 1. Interval placement of 2 left-sided large bore chest tubes, subclavian line and endotracheal tube as described. 2. Better aeration left lung base with persistent left-sided pleural thickening and patchy opacity left mid and left lower lung.     Xr Chest Ap Or Pa    Result Date: 10/27/2017  XR CHEST AP OR PA HISTORY:  Chest tube COMPARISON:  Chest radiographs dating back to 10/25/2017, CT chest 10/24/2017 TECHNIQUE:  Single, AP upright view of the chest. FINDINGS: Cardiac silhouette within normal limits in size. The pulmonary  vasculature is engorged. Loculated left pleural effusion persists, with associated basilar opacity. Previously described pneumothorax is not well-seen . A transthoracic pigtail catheter terminates at the left hemithoracic base.     IMPRESSION:  1.  Previously identified pneumothorax is not confidently identified on the current study. Left-sided pigtail chest tube is in place. 2.  Subtle pulmonary vascular congestion. I have reviewed the images and agree with the Resident interpretation.     Xr Chest Ap Or Pa    Result Date: 10/26/2017  XR CHEST AP OR PA HISTORY:   Male 52 years     s/p chest tube placement. COMPARISON:  10/25/2017 FINDINGS:  A single frontal projection of the chest shows a pigtail catheter on the left associated loculated fluid in probable gas. Ill-defined density partially obscuring the medial aspect of left hemidiaphragm in the right lung is clear.  The cardiac silhouette is possibly enlarged.  The pulmonary venous vasculature is indistinct left more than right..  The bones are unremarkable.  The soft tissues are grossly normal.        IMPRESSION: Stable appearance of left pigtail catheter with loculated pleural fluid and persistent gas (hydropneumothorax) in the pleural space. Ill-defined opacity partially. The left hemidiaphragm essentially unchanged.         Xr Chest Ap Or Pa    Result Date: 10/25/2017  XR CHEST AP OR PA HISTORY:  Chest tube COMPARISON:  Chest radiograph and chest CT 10/24/2017 TECHNIQUE:  Single, AP semiupright view of the chest. FINDINGS: The cardiac mediastinal silhouette appears stable. Decreased pulmonary vascular congestion. Small left pleural effusion and associated left lower lung opacity, not significantly changed. The loculated left pneumothorax seen on the prior exam is no longer visualized. Left-sided pigtail chest tube remains in stable position. The right lung and pleural space appear clear.     IMPRESSION:  1.  Left-sided pigtail chest tube remains in stable  position. 2.  Small left pleural effusion and left lower lung consolidation again seen. 3.  The loculated left pneumothorax seen on the prior exam is no longer visualized. 4.  Decreased pulmonary vascular congestion. I have reviewed the images and agree with the Resident interpretation.     Xr Chest Ap Or Pa    Result Date: 10/24/2017  XR CHEST AP OR PA DATE OF EXAM:  10/24/2017 9:37 AM. CLINICAL INFORMATION:  Chest tube. TECHNIQUE: AP semiupright view of the chest performed at 0935 hours. COMPARISON: Chest CT performed 10/21/2017. FINDINGS: Overlying EKG leads. Cardiomediastinal silhouette within normal limits for technique. Small lung volumes with mild diffuse vascular/interstitial prominence. Left basilar opacity with obscuration of the left hemidiaphragm. Interval placement of left lateral pigtail pleural catheter, with partial evacuation of previously noted loculated effusion. Small amount of pleural air/pneumothorax now within the cavity.     IMPRESSION:  1. Interval placement left lateral pigtail pleural catheter, with partial evacuation of loculated effusion. Now with small amount of loculated air/pneumothorax within the cavity. 2. Small lung volumes with mild diffuse vascular/interstitial prominence. Left basilar atelectasis/infiltrate.     Xr Chest Pa And Lateral    Result Date: 11/2/2017  XR CHEST PA AND LATERAL HISTORY:  Chest tube removal COMPARISON:  Chest radiographs dating back to 10/31/2017 TECHNIQUE:  Frontal and lateral views of the chest. FINDINGS: The cardiomediastinal silhouette is within normal limits. The pulmonary vasculature is normal.  Loculated left pleural effusion and associated left basilar consolidation again seen. No pneumothorax. No right-sided focal consolidation or significant pleural effusion. Interval removal of left-sided chest tube. Left subclavian line again seen, terminating at the mid superior vena cava.     IMPRESSION: 1.  Interval removal of left chest tube. 2.   Persistent, loculated left pleural effusion and associated left basilar opacity that may be atelectatic or infectious. I have reviewed the images and agree with the Resident interpretation.     Xr Toe 2+ View Left    Result Date: 10/25/2017  XR TOE 2+ VW LEFT,  10/25/2017. INDICATION: Nonhealing wound to the left hallux. COMPARISON: None.     FINDINGS/IMPRESSION: Superficial ulceration of the great toe medially. No underlying osseous lytic or destructive lesion. If continued concern for osteomyelitis, would recommend MRI. Severe degenerative changes at the first metatarsophalangeal joint. Atherosclerotic calcifications.     Ir Chest Tube And/ Or Drainage Catheter Placement For Ptx, Pleural Effusion    Result Date: 10/23/2017  CLINICAL HISTORY:  52 years-old Male presents with loculated left pleural effusion. PROCEDURE PERFORMED: Chest tube placement. STAFF:  Miguel Wing CONSENT:    The risks, benefits and alternatives to the procedure were explained to the patient and informed written consent was obtained. SEDATION:  Conscious moderate sedation was administered with continuous monitoring of the patient under direct supervision. Please refer to nursing flow sheet for doses of medications administered intravenously during the procedure. This was monitored independently by the interventional radiology nurse. Total sedation time was: 36 minutes.  TECHNIQUE: The left pleural effusion was identified by Ultrasound.  The left chest skin site for entrance of the chest tube was marked.  The site was prepped and draped in a sterile fashion.  After the infiltration of the skin and deep tissues with local anesthetic, a 5 Wallisian Yueh needle was advanced into the pleural effusion using Ultrasound guidance. After aspiration of pus, a guidewire was advanced through the needle into the  left pleural effusion.  Then after serial tract dilation, a  10 -Wallisian locking Pig-tail catheter chest drainage tube was advanced over the  guidewire.  The guidewire was removed and the drainage tube was connected to a Pleuravac. The patient tolerated the procedure well with no immediate complications. This procedure was performed using Ultrasound Fluoroscopy was used with a total time of  1.8 minutes.     IMPRESSION:  A chest drainage tube was placed as described above. PLAN: Left loculated pleural effusion to be treated with tPA and dornase per protocol.       Discharge Exam:  General Appearance:  Awake and alert, no distress  Lungs: decreased breath sounds L lung base  Heart: Normal rate and rhythm, S1 and S2 normal  Abdomen: Soft, nontender  Extremities: Extremities normal, atraumatic, no cyanosis or edema  Neuro: Awake and alert. No focal deficits.    Disposition: Home with home health care    Patient Instructions:   Activity: activity as tolerated  Diet: regular diet    Follow-up with PCP, infectious disease     More than 30 minutes spent in discharging patient.    Kyle Edmonds MD  [facility] Hospitalist  [phone]     no

## 2025-02-22 NOTE — OB RN PATIENT PROFILE - SUICIDE SCREENING QUESTION 3
10/8/2024      Laura Brown  [address] [place] [id number]        Dear Laura,    Your health is very important to us. Our records show you are due for an appointment.    If you have another Primary Care Provider, please contact our office so we can update your records. Please contact our office at Dept: [phone] to schedule an appointment, or you may schedule using the [employer] barbie.     Sincerely,       Sheryl Mark DO   [facility] [address]  [address] [address] [place] [address]  Dept: [phone]  Dept Fax: [phone]         [facility] offers online access to your health information via [email or web address]  
No

## 2025-02-23 LAB
BASOPHILS # BLD AUTO: 0.03 K/UL — SIGNIFICANT CHANGE UP (ref 0–0.2)
BASOPHILS NFR BLD AUTO: 0.3 % — SIGNIFICANT CHANGE UP (ref 0–2)
BLD GP AB SCN SERPL QL: SIGNIFICANT CHANGE UP
EOSINOPHIL # BLD AUTO: 0.13 K/UL — SIGNIFICANT CHANGE UP (ref 0–0.5)
EOSINOPHIL NFR BLD AUTO: 1.3 % — SIGNIFICANT CHANGE UP (ref 0–6)
HCT VFR BLD CALC: 34.5 % — SIGNIFICANT CHANGE UP (ref 34.5–45)
HGB BLD-MCNC: 11.5 G/DL — SIGNIFICANT CHANGE UP (ref 11.5–15.5)
IMM GRANULOCYTES # BLD AUTO: 0.06 K/UL — SIGNIFICANT CHANGE UP (ref 0–0.07)
IMM GRANULOCYTES NFR BLD AUTO: 0.6 % — SIGNIFICANT CHANGE UP (ref 0–0.9)
LYMPHOCYTES # BLD AUTO: 2.74 K/UL — SIGNIFICANT CHANGE UP (ref 1–3.3)
LYMPHOCYTES NFR BLD AUTO: 28.4 % — SIGNIFICANT CHANGE UP (ref 13–44)
MCHC RBC-ENTMCNC: 29.5 PG — SIGNIFICANT CHANGE UP (ref 27–34)
MCHC RBC-ENTMCNC: 33.3 G/DL — SIGNIFICANT CHANGE UP (ref 32–36)
MCV RBC AUTO: 88.5 FL — SIGNIFICANT CHANGE UP (ref 80–100)
MONOCYTES # BLD AUTO: 0.83 K/UL — SIGNIFICANT CHANGE UP (ref 0–0.9)
MONOCYTES NFR BLD AUTO: 8.6 % — SIGNIFICANT CHANGE UP (ref 2–14)
NEUTROPHILS # BLD AUTO: 5.87 K/UL — SIGNIFICANT CHANGE UP (ref 1.8–7.4)
NEUTROPHILS NFR BLD AUTO: 60.8 % — SIGNIFICANT CHANGE UP (ref 43–77)
NRBC # BLD AUTO: 0 K/UL — SIGNIFICANT CHANGE UP (ref 0–0)
NRBC # FLD: 0 K/UL — SIGNIFICANT CHANGE UP (ref 0–0)
NRBC BLD AUTO-RTO: 0 /100 WBCS — SIGNIFICANT CHANGE UP (ref 0–0)
PLATELET # BLD AUTO: 145 K/UL — LOW (ref 150–400)
PMV BLD: 11 FL — SIGNIFICANT CHANGE UP (ref 7–13)
RBC # BLD: 3.9 M/UL — SIGNIFICANT CHANGE UP (ref 3.8–5.2)
RBC # FLD: 13.2 % — SIGNIFICANT CHANGE UP (ref 10.3–14.5)
WBC # BLD: 9.66 K/UL — SIGNIFICANT CHANGE UP (ref 3.8–10.5)
WBC # FLD AUTO: 9.66 K/UL — SIGNIFICANT CHANGE UP (ref 3.8–10.5)

## 2025-02-23 PROCEDURE — 59400 OBSTETRICAL CARE: CPT

## 2025-02-23 PROCEDURE — 88307 TISSUE EXAM BY PATHOLOGIST: CPT | Mod: 26

## 2025-02-23 RX ORDER — HYDROCORTISONE 10 MG/G
1 CREAM TOPICAL EVERY 6 HOURS
Refills: 0 | Status: DISCONTINUED | OUTPATIENT
Start: 2025-02-23 | End: 2025-02-24

## 2025-02-23 RX ORDER — SIMETHICONE 80 MG
80 TABLET,CHEWABLE ORAL EVERY 4 HOURS
Refills: 0 | Status: DISCONTINUED | OUTPATIENT
Start: 2025-02-23 | End: 2025-02-24

## 2025-02-23 RX ORDER — IBUPROFEN 200 MG
600 TABLET ORAL EVERY 6 HOURS
Refills: 0 | Status: DISCONTINUED | OUTPATIENT
Start: 2025-02-23 | End: 2025-02-24

## 2025-02-23 RX ORDER — CLOSTRIDIUM TETANI TOXOID ANTIGEN (FORMALDEHYDE INACTIVATED), CORYNEBACTERIUM DIPHTHERIAE TOXOID ANTIGEN (FORMALDEHYDE INACTIVATED), BORDETELLA PERTUSSIS TOXOID ANTIGEN (GLUTARALDEHYDE INACTIVATED), BORDETELLA PERTUSSIS FILAMENTOUS HEMAGGLUTININ ANTIGEN (FORMALDEHYDE INACTIVATED), BORDETELLA PERTUSSIS PERTACTIN ANTIGEN, AND BORDETELLA PERTUSSIS FIMBRIAE 2/3 ANTIGEN 5; 2; 2.5; 5; 3; 5 [LF]/.5ML; [LF]/.5ML; UG/.5ML; UG/.5ML; UG/.5ML; UG/.5ML
0.5 INJECTION, SUSPENSION INTRAMUSCULAR ONCE
Refills: 0 | Status: DISCONTINUED | OUTPATIENT
Start: 2025-02-23 | End: 2025-02-24

## 2025-02-23 RX ORDER — PRENATAL 136/IRON/FOLIC ACID 27 MG-1 MG
1 TABLET ORAL DAILY
Refills: 0 | Status: DISCONTINUED | OUTPATIENT
Start: 2025-02-23 | End: 2025-02-24

## 2025-02-23 RX ORDER — IBUPROFEN 200 MG
600 TABLET ORAL EVERY 6 HOURS
Refills: 0 | Status: COMPLETED | OUTPATIENT
Start: 2025-02-23 | End: 2026-01-22

## 2025-02-23 RX ORDER — OXYCODONE HYDROCHLORIDE 30 MG/1
5 TABLET ORAL
Refills: 0 | Status: DISCONTINUED | OUTPATIENT
Start: 2025-02-23 | End: 2025-02-24

## 2025-02-23 RX ORDER — DIBUCAINE 10 MG/G
1 OINTMENT TOPICAL EVERY 6 HOURS
Refills: 0 | Status: DISCONTINUED | OUTPATIENT
Start: 2025-02-23 | End: 2025-02-24

## 2025-02-23 RX ORDER — DIPHENHYDRAMINE HCL 12.5MG/5ML
25 ELIXIR ORAL EVERY 6 HOURS
Refills: 0 | Status: DISCONTINUED | OUTPATIENT
Start: 2025-02-23 | End: 2025-02-24

## 2025-02-23 RX ORDER — OXYTOCIN-SODIUM CHLORIDE 0.9% IV SOLN 30 UNIT/500ML 30-0.9/5 UT/ML-%
SOLUTION INTRAVENOUS
Qty: 30 | Refills: 0 | Status: DISCONTINUED | OUTPATIENT
Start: 2025-02-23 | End: 2025-02-23

## 2025-02-23 RX ORDER — OXYCODONE HYDROCHLORIDE 30 MG/1
5 TABLET ORAL ONCE
Refills: 0 | Status: DISCONTINUED | OUTPATIENT
Start: 2025-02-23 | End: 2025-02-24

## 2025-02-23 RX ORDER — BENZOCAINE 220 MG/G
1 SPRAY, METERED PERIODONTAL EVERY 6 HOURS
Refills: 0 | Status: DISCONTINUED | OUTPATIENT
Start: 2025-02-23 | End: 2025-02-24

## 2025-02-23 RX ORDER — OXYTOCIN-SODIUM CHLORIDE 0.9% IV SOLN 30 UNIT/500ML 30-0.9/5 UT/ML-%
167 SOLUTION INTRAVENOUS
Qty: 30 | Refills: 0 | Status: DISCONTINUED | OUTPATIENT
Start: 2025-02-23 | End: 2025-02-24

## 2025-02-23 RX ORDER — MODIFIED LANOLIN 100 %
1 CREAM (GRAM) TOPICAL EVERY 6 HOURS
Refills: 0 | Status: DISCONTINUED | OUTPATIENT
Start: 2025-02-23 | End: 2025-02-24

## 2025-02-23 RX ORDER — KETOROLAC TROMETHAMINE 30 MG/ML
30 INJECTION, SOLUTION INTRAMUSCULAR; INTRAVENOUS ONCE
Refills: 0 | Status: DISCONTINUED | OUTPATIENT
Start: 2025-02-23 | End: 2025-02-23

## 2025-02-23 RX ORDER — WITCH HAZEL LEAF
1 FLUID EXTRACT MISCELLANEOUS EVERY 4 HOURS
Refills: 0 | Status: DISCONTINUED | OUTPATIENT
Start: 2025-02-23 | End: 2025-02-24

## 2025-02-23 RX ORDER — PRAMOXINE HCL 1 %
1 GEL (GRAM) TOPICAL EVERY 4 HOURS
Refills: 0 | Status: DISCONTINUED | OUTPATIENT
Start: 2025-02-23 | End: 2025-02-24

## 2025-02-23 RX ORDER — MAGNESIUM HYDROXIDE 400 MG/5ML
30 SUSPENSION ORAL
Refills: 0 | Status: DISCONTINUED | OUTPATIENT
Start: 2025-02-23 | End: 2025-02-24

## 2025-02-23 RX ORDER — ACETAMINOPHEN 500 MG/5ML
975 LIQUID (ML) ORAL
Refills: 0 | Status: DISCONTINUED | OUTPATIENT
Start: 2025-02-23 | End: 2025-02-24

## 2025-02-23 RX ADMIN — KETOROLAC TROMETHAMINE 30 MILLIGRAM(S): 30 INJECTION, SOLUTION INTRAMUSCULAR; INTRAVENOUS at 17:12

## 2025-02-23 RX ADMIN — Medication 3 MILLILITER(S): at 21:22

## 2025-02-23 RX ADMIN — OXYTOCIN-SODIUM CHLORIDE 0.9% IV SOLN 30 UNIT/500ML 167 MILLIUNIT(S)/MIN: 30-0.9/5 SOLUTION at 18:10

## 2025-02-23 RX ADMIN — Medication 975 MILLIGRAM(S): at 20:56

## 2025-02-23 RX ADMIN — OXYTOCIN-SODIUM CHLORIDE 0.9% IV SOLN 30 UNIT/500ML 2 MILLIUNIT(S)/MIN: 30-0.9/5 SOLUTION at 03:31

## 2025-02-23 RX ADMIN — SODIUM CHLORIDE 125 MILLILITER(S): 9 INJECTION, SOLUTION INTRAVENOUS at 03:03

## 2025-02-23 NOTE — OB PROVIDER H&P - NSLOWPPHRISK_OBGYN_A_OB
No previous uterine incision/Fletcher Pregnancy/Less than or equal to 4 previous vaginal births/No known bleeding disorder/No history of postpartum hemorrhage

## 2025-02-23 NOTE — OB PROVIDER DELIVERY SUMMARY - NSPROVIDERDELIVERYNOTE_OBGYN_ALL_OB_FT
at 4:16PM of a live female, 3010 gm, 4hgi42bs and Apgars 9/9. Delivered LUCHO, no nuchal cord, clear fluid. Infant's head delivered with maternal expulsive efforts. Shoulders delivered without difficulty followed by the rest of the body. Nose and mouth were bulb suctioned. Cord clamped and cut after delay. Samples obtained. Baby handed to patient. Pitocin started. Placenta delivered spontaneously, intact, 3VC. Fundus firm, minimal bleeding. Perineum and vagina inspected. First degree laceration and right periurethral laceration repaired with chromic suture. EBL 157cc. Hemostasis noted. Pt tolerated procedure well, in stable condition, recovering in LDR. Infant in LDR. Instrument/sponge count correct x 2 and confirmed by nurse.

## 2025-02-23 NOTE — DISCHARGE NOTE OB - CARE PROVIDER_API CALL
Troy Messina  Obstetrics and Gynecology  [address] [place] [address]  Phone: [phone]  Fax: [phone]  Follow Up Time: 1 month

## 2025-02-23 NOTE — OB PROVIDER LABOR PROGRESS NOTE - NS_OBIHIFHRDETAILS_OBGYN_ALL_OB_FT
125, mod clara, + accels, + variable decel
125, moderate variability, + accels, - decels
Baseline FHR 130s, moderate variability, recurrent variable decels
125, mod clara, + accels, - decels

## 2025-02-23 NOTE — OB PROVIDER LABOR PROGRESS NOTE - ASSESSMENT
-FHR tracing Cat 1  -Continue pitocin   -SVE as indicated / prn     Above note per Dr. Messina 
-Patient repositioned with improvement in tracing   -Continue pitocin  -SVE as indicated / prn 
-VSS  -FHT Cat II  -IUPC placed. Amnioinfusion started.  -Continue with pitocin, currently at 20.  -Continue with repositioning.
-FHR tracing Cat 1  -AROM clear fluid   -Continue pitocin   -FSE placed   -SVE as indicated / prn     above per Dr. Portillo exam/plan 
Statement Selected

## 2025-02-23 NOTE — OB PROVIDER H&P - ASSESSMENT
A/P:   Patient is a 34y  at 38w5d weeks GA admitted for induction of labor for FGR    -Admit to L&D  -Consent  -Admission labs  -IV fluids  -Fetus: Cat I tracing. Continuous toco and fetal monitoring.   -GBS: Negative, no GBS ppx required   -Analgesia: epidural PRN  -Plan for IOL with pitocin     Discussed with Dr. Messina

## 2025-02-23 NOTE — DISCHARGE NOTE OB - FINANCIAL ASSISTANCE
[facility] provides services at a reduced cost to those who are determined to be eligible through [facility]’s financial assistance program. Information regarding [facility]’s financial assistance program can be found by going to [email or web address] or by calling [phone].

## 2025-02-23 NOTE — OB PROVIDER H&P - HISTORY OF PRESENT ILLNESS
Patient is a 34 year old  at 38w5d by LMP who presents to L&D for induction of labor. Reports irregular abdominal cramping, no leakage of fluid or vaginal bleeding. No other complaints at this time.     TAMIKO:  3/3/25   LMP: 24     Pregnancy course: FGR (AC 5%, EFW 13%)       Obhx: 2021  at 40wks, SAB     Gynhx: denies fibroids, cysts, STIs, abnl paps   Pmhx: denies   Pshx: wisdom teeth  Meds: PNV   Allergies: PCNs, keflex (rash)  Social Hx: denies tobacco, recreational drug, alcohol use during pregnancy. Feels safe at home       Ultrasound:  cephalic, anterior placenta   EFW:  2765gm, EFW 13%, AC 5%

## 2025-02-23 NOTE — DISCHARGE NOTE OB - PATIENT PORTAL LINK FT
You can access the FollowMyHealth Patient Portal offered by [facility] by registering at the following website: [email or web address]. By joining [facility]’s FollowMyHealth portal, you will also be able to view your health information using other applications (apps) compatible with our system.

## 2025-02-23 NOTE — OB RN DELIVERY SUMMARY - NS_GENERALBABYACOMMENTA_OBGYN_ALL_OB_FT
Bleeding noted around cord clamp. Dr. Orellana at bedside to assess. Patient to be monitored in NICU as per Dr. Orellana.

## 2025-02-23 NOTE — OB PROVIDER H&P - NSHPPHYSICALEXAM_GEN_ALL_CORE
T(C): 36.9 (02-22-25 @ 23:07), Max: 36.9 (02-22-25 @ 23:07)  HR: 68 (02-22-25 @ 23:18) (68 - 68)  BP: 125/69 (02-22-25 @ 23:18) (125/69 - 125/69)  RR: 16 (02-22-25 @ 23:07) (16 - 16)    Gen: NAD, well-appearing, AAOx3   Resp: breathing comfortably on RA  Abd: Soft, gravid, nontender  SVE:  2/60/-3  Bedside sono: vertex   FHT: baseline 120, moderate variability, +accels, -decels  Jellico: q 5 mins

## 2025-02-23 NOTE — OB RN DELIVERY SUMMARY - NSSELHIDDEN_OBGYN_ALL_OB_FT
[NS_DeliveryAttending1_OBGYN_ALL_OB_FT:[id number]==],[NS_DeliveryAssist1_OBGYN_ALL_OB_FT:[id number]=],[NS_DeliveryRN_OBGYN_ALL_OB_FT:[id number]=]

## 2025-02-23 NOTE — OB PROVIDER DELIVERY SUMMARY - NSSELHIDDEN_OBGYN_ALL_OB_FT
[NS_DeliveryAttending1_OBGYN_ALL_OB_FT:[id number]==],[NS_DeliveryAssist1_OBGYN_ALL_OB_FT:[id number]=]

## 2025-02-23 NOTE — OB RN DELIVERY SUMMARY - NS_SEPSISRSKCALC_OBGYN_ALL_OB_FT
EOS calculated successfully. EOS Risk Factor: 0.5/1000 live births ([facility] national incidence); GA=38w6d; Temp=98.4; ROM=7.733; GBS='Negative'; Antibiotics='No antibiotics or any antibiotics < 2 hrs prior to birth'

## 2025-02-24 ENCOUNTER — NON-APPOINTMENT (OUTPATIENT)
Age: 35
End: 2025-02-24

## 2025-02-24 ENCOUNTER — APPOINTMENT (OUTPATIENT)
Dept: ANTEPARTUM | Facility: CLINIC | Age: 35
End: 2025-02-24

## 2025-02-24 ENCOUNTER — APPOINTMENT (OUTPATIENT)
Dept: OBGYN | Facility: CLINIC | Age: 35
End: 2025-02-24

## 2025-02-24 VITALS
TEMPERATURE: 98 F | SYSTOLIC BLOOD PRESSURE: 126 MMHG | OXYGEN SATURATION: 97 % | HEART RATE: 59 BPM | RESPIRATION RATE: 18 BRPM | DIASTOLIC BLOOD PRESSURE: 80 MMHG

## 2025-02-24 LAB
HCT VFR BLD CALC: 31.3 % — LOW (ref 34.5–45)
HGB BLD-MCNC: 10.4 G/DL — LOW (ref 11.5–15.5)
T PALLIDUM AB TITR SER: NEGATIVE — SIGNIFICANT CHANGE UP

## 2025-02-24 PROCEDURE — 59050 FETAL MONITOR W/REPORT: CPT

## 2025-02-24 PROCEDURE — 86901 BLOOD TYPING SEROLOGIC RH(D): CPT

## 2025-02-24 PROCEDURE — 36415 COLL VENOUS BLD VENIPUNCTURE: CPT

## 2025-02-24 PROCEDURE — 88307 TISSUE EXAM BY PATHOLOGIST: CPT

## 2025-02-24 PROCEDURE — 85014 HEMATOCRIT: CPT

## 2025-02-24 PROCEDURE — 85025 COMPLETE CBC W/AUTO DIFF WBC: CPT

## 2025-02-24 PROCEDURE — 86900 BLOOD TYPING SEROLOGIC ABO: CPT

## 2025-02-24 PROCEDURE — 86850 RBC ANTIBODY SCREEN: CPT

## 2025-02-24 PROCEDURE — 85018 HEMOGLOBIN: CPT

## 2025-02-24 PROCEDURE — 86780 TREPONEMA PALLIDUM: CPT

## 2025-02-24 RX ORDER — IBUPROFEN 200 MG
1 TABLET ORAL
Qty: 28 | Refills: 0
Start: 2025-02-24 | End: 2025-03-02

## 2025-02-24 RX ORDER — MEASLES,MUMPS,RUBELLA LIVE VAC 20000/0.5
0.5 VIAL (EA) SUBCUTANEOUS ONCE
Refills: 0 | Status: DISCONTINUED | OUTPATIENT
Start: 2025-02-24 | End: 2025-02-24

## 2025-02-24 RX ORDER — ACETAMINOPHEN 500 MG/5ML
2 LIQUID (ML) ORAL
Qty: 56 | Refills: 0
Start: 2025-02-24 | End: 2025-03-02

## 2025-02-24 RX ADMIN — Medication 3 MILLILITER(S): at 05:34

## 2025-02-24 RX ADMIN — Medication 600 MILLIGRAM(S): at 12:11

## 2025-02-24 RX ADMIN — Medication 975 MILLIGRAM(S): at 15:51

## 2025-02-24 RX ADMIN — Medication 1 TABLET(S): at 12:11

## 2025-02-24 RX ADMIN — Medication 975 MILLIGRAM(S): at 04:11

## 2025-02-24 RX ADMIN — Medication 600 MILLIGRAM(S): at 17:52

## 2025-02-24 RX ADMIN — Medication 975 MILLIGRAM(S): at 08:44

## 2025-02-24 RX ADMIN — Medication 600 MILLIGRAM(S): at 05:21

## 2025-02-24 NOTE — PROGRESS NOTE ADULT - SUBJECTIVE AND OBJECTIVE BOX
INTERVAL HPI/OVERNIGHT EVENTS:  34y Female s/p labor epidural on 02/23/25    Vital Signs Last 24 Hrs  T(C): 36.9 (24 Feb 2025 04:00), Max: 36.9 (24 Feb 2025 04:00)  T(F): 98.5 (24 Feb 2025 04:00), Max: 98.5 (24 Feb 2025 04:00)  HR: 65 (24 Feb 2025 04:00) (61 - 107)  BP: 120/77 (24 Feb 2025 04:00) (109/58 - 148/83)  BP(mean): --  RR: 16 (24 Feb 2025 04:00) (16 - 18)  SpO2: 98% (24 Feb 2025 04:00) (98% - 98%)    Parameters below as of 24 Feb 2025 04:00  Patient On (Oxygen Delivery Method): room air            Patient's overall anesthesia experience:  satisfied    Patient seen and doing well     No headache      No residual numbness or weakness, sensory and motor function intact    No anesthetic complications or complaints noted or reported                 
TERRY RIDDLE is a 34y  now PPD#1 s/p spontaneous vaginal delivery at 38+6weeks gestation, for FGR (AC 5%)    S:    No acute events overnight.   The patient has no complaints.  Pain controlled with current treatment regimen.   She is ambulating without difficulty and tolerating PO.   + flatus/-BM/+ voiding   She endorses appropriate lochia, which is decreasing.   She is breastfeeding and bottle feeding without difficulty.   She denies fevers, chills, nausea and vomiting.   She denies lightheadedness, dizziness, palpitations, chest pain and SOB.     O:    T(C): 36.9 (25 @ 04:00), Max: 36.9 (25 @ 04:00)  HR: 65 (25 @ 04:00) (48 - 107)  BP: 120/77 (25 @ 04:00) (81/42 - 148/83)  RR: 16 (25 @ 04:00) (16 - 18)  SpO2: 98% (25 @ 04:00) (98% - 100%)    Gen: NAD, AOx3  Pulm: Resting comfortable on room air   Abdomen:  Soft, non-tender, non-distended  Uterus:  Fundus firm below umbilicus  VE:  Expectant lochia  Ext:  Non-tender and non-edematous                          10.4   x     )-----------( x        ( 2025 05:24 )             31.3

## 2025-02-24 NOTE — PROGRESS NOTE ADULT - ASSESSMENT
A/P:  TERRY RIDDLE is a 34y  now PPD#1 s/p spontaneous vaginal delivery at 38+6weeks gestation, for FGR (AC 5%)    -Vital signs stable  -Hgb: 11.5>10.4 -> AM labs pending   -Voiding, tolerating PO, bowel function nml   -Advance care as tolerated   -Continue routine postpartum care and education  -Female infant in NICU  -Dispo: Pt is stable, doing well and meeting all postpartum milestones. Possible discharge to home today pending attending approval.

## 2025-02-25 ENCOUNTER — NON-APPOINTMENT (OUTPATIENT)
Age: 35
End: 2025-02-25

## 2025-02-27 ENCOUNTER — APPOINTMENT (OUTPATIENT)
Dept: ANTEPARTUM | Facility: CLINIC | Age: 35
End: 2025-02-27

## 2025-03-03 ENCOUNTER — APPOINTMENT (OUTPATIENT)
Dept: OBGYN | Facility: CLINIC | Age: 35
End: 2025-03-03

## 2025-03-03 ENCOUNTER — APPOINTMENT (OUTPATIENT)
Dept: ANTEPARTUM | Facility: CLINIC | Age: 35
End: 2025-03-03

## 2025-03-07 LAB — SURGICAL PATHOLOGY STUDY: SIGNIFICANT CHANGE UP

## 2025-04-09 ENCOUNTER — APPOINTMENT (OUTPATIENT)
Dept: OBGYN | Facility: CLINIC | Age: 35
End: 2025-04-09
Payer: COMMERCIAL

## 2025-04-09 VITALS
DIASTOLIC BLOOD PRESSURE: 62 MMHG | WEIGHT: 158.19 LBS | SYSTOLIC BLOOD PRESSURE: 102 MMHG | BODY MASS INDEX: 26.35 KG/M2 | HEIGHT: 65 IN

## 2025-04-09 PROCEDURE — 0503F POSTPARTUM CARE VISIT: CPT

## 2025-05-14 ENCOUNTER — APPOINTMENT (OUTPATIENT)
Dept: OBGYN | Facility: CLINIC | Age: 35
End: 2025-05-14
Payer: COMMERCIAL

## 2025-05-14 VITALS — HEIGHT: 65 IN | DIASTOLIC BLOOD PRESSURE: 70 MMHG | SYSTOLIC BLOOD PRESSURE: 116 MMHG

## 2025-05-14 DIAGNOSIS — Z01.419 ENCOUNTER FOR GYNECOLOGICAL EXAMINATION (GENERAL) (ROUTINE) W/OUT ABNORMAL FINDINGS: ICD-10-CM

## 2025-05-14 PROCEDURE — 99395 PREV VISIT EST AGE 18-39: CPT

## 2025-05-14 PROCEDURE — 99459 PELVIC EXAMINATION: CPT

## 2025-05-20 LAB
CYTOLOGY CVX/VAG DOC THIN PREP: NORMAL
HPV HIGH+LOW RISK DNA PNL CVX: NOT DETECTED

## 2025-05-28 ENCOUNTER — NON-APPOINTMENT (OUTPATIENT)
Age: 35
End: 2025-05-28

## 2025-06-24 NOTE — OB PROVIDER LABOR PROGRESS NOTE - NS_SUBJECTIVE/OBJECTIVE_OBGYN_ALL_OB_FT
Patient re-examined at bedside. No current complaints.
note and exam per Dr. Messina
Attending Discharge Physical Examination:

## 2025-07-16 RX ORDER — FLUCONAZOLE 150 MG/1
150 TABLET ORAL
Qty: 2 | Refills: 3 | Status: ACTIVE | COMMUNITY
Start: 2025-07-16 | End: 1900-01-01